# Patient Record
Sex: FEMALE | Race: WHITE | NOT HISPANIC OR LATINO | Employment: UNEMPLOYED | URBAN - METROPOLITAN AREA
[De-identification: names, ages, dates, MRNs, and addresses within clinical notes are randomized per-mention and may not be internally consistent; named-entity substitution may affect disease eponyms.]

---
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
## 2022-03-30 ENCOUNTER — OFFICE VISIT (OUTPATIENT)
Dept: FAMILY MEDICINE CLINIC | Facility: CLINIC | Age: 22
End: 2022-03-30
Payer: MEDICARE

## 2022-03-30 ENCOUNTER — TELEPHONE (OUTPATIENT)
Dept: PSYCHIATRY | Facility: CLINIC | Age: 22
End: 2022-03-30

## 2022-03-30 VITALS
DIASTOLIC BLOOD PRESSURE: 60 MMHG | HEART RATE: 64 BPM | HEIGHT: 64 IN | TEMPERATURE: 97.9 F | BODY MASS INDEX: 17.75 KG/M2 | OXYGEN SATURATION: 99 % | SYSTOLIC BLOOD PRESSURE: 100 MMHG | WEIGHT: 104 LBS

## 2022-03-30 DIAGNOSIS — F41.1 GENERALIZED ANXIETY DISORDER: Primary | ICD-10-CM

## 2022-03-30 DIAGNOSIS — Z11.8 SCREENING FOR CHLAMYDIAL DISEASE: ICD-10-CM

## 2022-03-30 DIAGNOSIS — R63.6 UNDERWEIGHT: ICD-10-CM

## 2022-03-30 PROCEDURE — 99214 OFFICE O/P EST MOD 30 MIN: CPT | Performed by: FAMILY MEDICINE

## 2022-03-30 PROCEDURE — 87591 N.GONORRHOEAE DNA AMP PROB: CPT | Performed by: FAMILY MEDICINE

## 2022-03-30 PROCEDURE — 87491 CHLMYD TRACH DNA AMP PROBE: CPT | Performed by: FAMILY MEDICINE

## 2022-03-30 RX ORDER — SULFACETAMIDE SODIUM AND SULFUR 9; 4.5 MG/G; MG/G
RINSE TOPICAL
COMMUNITY
Start: 2022-03-16

## 2022-03-30 RX ORDER — CLINDAMYCIN PHOSPHATE 10 UG/ML
LOTION TOPICAL
COMMUNITY
Start: 2022-03-16

## 2022-03-30 NOTE — PROGRESS NOTES
BMI Counseling: Body mass index is 18 13 kg/m²  The BMI is below normal  Patient advised to gain weight  Rationale for BMI follow-up plan is due to patient being underweight  Depression Screening and Follow-up Plan: Patient was screened for depression during today's encounter  They screened negative with a PHQ-2 score of 0  Subjective:   Chief Complaint   Patient presents with    Follow-up     chronic conditions        Patient ID: Karthik Sanders is a 23 y o  female  Patient history of generalized anxiety disorder she was started on Zoloft 25 mg once a dayshe has been tolerated medication well without any side effects she notice slight improve in her mood but not quite diarrhea the she been having some stress at home her son was sick she has to go to [facility] at the fairly multiple time and and the affect her sleep her appetite but in general she feel she is calmer compared with before after she start the medication      The following portions of the patient's history were reviewed and updated as appropriate: allergies, current medications, past family history, past medical history, past social history, past surgical history and problem list     Review of Systems   Constitutional: Negative for activity change, appetite change, fatigue and fever  HENT: Negative for congestion, ear pain, sinus pressure, sinus pain and sore throat  Eyes: Negative for pain, discharge, redness and itching  Respiratory: Negative for cough, chest tightness, shortness of breath and stridor  Cardiovascular: Negative for chest pain, palpitations and leg swelling  Gastrointestinal: Negative for abdominal pain, blood in stool, constipation, diarrhea and nausea  Genitourinary: Negative for dysuria, flank pain, frequency and hematuria  Musculoskeletal: Negative for back pain, joint swelling and neck pain  Skin: Negative for pallor and rash     Neurological: Negative for dizziness, tremors, weakness, numbness and headaches  Hematological: Does not bruise/bleed easily  Objective:  Vitals:    03/30/22 1134   BP: 100/60   Pulse: 64   Temp: 97 9 °F (36 6 °C)   TempSrc: Tympanic   SpO2: 99%   Weight: 47 2 kg (104 lb)   Height: 5' 3 5" (1 613 m)      Physical Exam  Vitals and nursing note reviewed  Constitutional:       General: She is not in acute distress  Appearance: She is well-developed  She is not diaphoretic  HENT:      Head: Normocephalic  Right Ear: Tympanic membrane, ear canal and external ear normal       Left Ear: Tympanic membrane, ear canal and external ear normal       Nose: Nose normal  No congestion or rhinorrhea  Mouth/Throat:      Mouth: Mucous membranes are moist       Pharynx: Oropharynx is clear  No oropharyngeal exudate or posterior oropharyngeal erythema  Eyes:      General:         Right eye: No discharge  Left eye: No discharge  Conjunctiva/sclera: Conjunctivae normal       Pupils: Pupils are equal, round, and reactive to light  Neck:      Vascular: No JVD  Cardiovascular:      Rate and Rhythm: Normal rate and regular rhythm  Heart sounds: Normal heart sounds  No murmur heard  No gallop  Pulmonary:      Effort: Pulmonary effort is normal  No respiratory distress  Breath sounds: Normal breath sounds  No stridor  No wheezing or rales  Chest:      Chest wall: No tenderness  Abdominal:      General: There is no distension  Palpations: Abdomen is soft  There is no mass  Tenderness: There is no abdominal tenderness  There is no rebound  Musculoskeletal:         General: No tenderness  Cervical back: Normal range of motion and neck supple  Lymphadenopathy:      Cervical: No cervical adenopathy  Skin:     General: Skin is warm  Findings: No erythema or rash  Neurological:      Mental Status: She is alert and oriented to person, place, and time  Motor: No weakness        Gait: Gait normal  Assessment/Plan:    Generalized anxiety disorder  A chronic patient diagnosed with generalized anxiety Twin disorder she is on Zoloft 25 mg her score MANDY 7 improved from 18-13 a patient tolerated well without side effect recommend to increase it to 50 mg once a day proper use and possible side effect review with the patient    Underweight  The BMI 18 13 no diarrhea and patient having good oral intake previous workup for weight was normal recommend the a increase the calorie intake and discussed important gain weight       Diagnoses and all orders for this visit:    Generalized anxiety disorder  -     sertraline (Zoloft) 50 mg tablet; Take 1 tablet (50 mg total) by mouth daily  -     Ambulatory Referral to [facility]; Future    Screening for chlamydial disease  -     Chlamydia/GC amplified DNA by PCR    Underweight    BMI less than 19,adult    Other orders  -     Sulfacetamide Sodium-Sulfur 9-4 5 % LIQD; Cleanse acne AREAS ONCE OR twice daily  -     clindamycin (CLEOCIN T) 1 % lotion; Apply thin layer TO acne AREAS ON body twice daily

## 2022-03-30 NOTE — TELEPHONE ENCOUNTER
PT LVm, returned pt call and informed pt of the wait list and have have added pt to wait list  Pt has referral

## 2022-04-01 NOTE — ASSESSMENT & PLAN NOTE
A chronic patient diagnosed with generalized anxiety Twin disorder she is on Zoloft 25 mg her score MANDY 7 improved from 18-13 a patient tolerated well without side effect recommend to increase it to 50 mg once a day proper use and possible side effect review with the patient

## 2022-04-02 LAB
C TRACH DNA SPEC QL NAA+PROBE: NEGATIVE
N GONORRHOEA DNA SPEC QL NAA+PROBE: NEGATIVE

## 2022-04-22 ENCOUNTER — OFFICE VISIT (OUTPATIENT)
Dept: FAMILY MEDICINE CLINIC | Facility: CLINIC | Age: 22
End: 2022-04-22
Payer: MEDICARE

## 2022-04-22 VITALS
RESPIRATION RATE: 16 BRPM | OXYGEN SATURATION: 99 % | TEMPERATURE: 97.9 F | DIASTOLIC BLOOD PRESSURE: 62 MMHG | SYSTOLIC BLOOD PRESSURE: 104 MMHG | WEIGHT: 101 LBS | BODY MASS INDEX: 17.24 KG/M2 | HEIGHT: 64 IN | HEART RATE: 80 BPM

## 2022-04-22 DIAGNOSIS — G44.89 OTHER HEADACHE SYNDROME: Primary | ICD-10-CM

## 2022-04-22 DIAGNOSIS — F41.1 GENERALIZED ANXIETY DISORDER: ICD-10-CM

## 2022-04-22 PROCEDURE — 99214 OFFICE O/P EST MOD 30 MIN: CPT | Performed by: FAMILY MEDICINE

## 2022-04-22 RX ORDER — FLUOXETINE HYDROCHLORIDE 20 MG/1
20 CAPSULE ORAL DAILY
Qty: 30 CAPSULE | Refills: 2 | Status: SHIPPED | OUTPATIENT
Start: 2022-04-22 | End: 2022-06-29 | Stop reason: ALTCHOICE

## 2022-04-22 NOTE — ASSESSMENT & PLAN NOTE
A chronic patient feel the Zoloft 50 mg not help in her her symptom actually is not controlled and would like different medication will stop Zoloft start her on Prozac 20 mg proper use and possible side effect discussed the patient patient was refer to psychiatric she is on the waiting next appointment in 6 months

## 2022-04-22 NOTE — ASSESSMENT & PLAN NOTE
A new diagnosis symptomatic it has been going on for long time since her child hold on and off mostly on her forehead radiate to the back and behind her eye a no nausea no sensitivity to the light no vomiting no numbness a no head trauma recommend Tylenol for the pain recommend headache diary the will refer the patient to see a neurology keep why will hydration and sleeping hygiene review

## 2022-04-22 NOTE — PROGRESS NOTES
Subjective:   Chief Complaint   Patient presents with    Headache        Patient ID: Rowan Sanders is a 23 y o  female  Patient's history of generalized anxiety disorder on Zoloft 50 mg she feel the medication is not helping her she been taking it since March and the symptom persistent  Also patient concerned about headache the she described it as tense no forehead behind her eye radiate to the back no nausea no vomiting no light sensitivity no sensitivity no ringing in the ear no numbness no muscle weakness no head trauma no lose control of the urine or stool per patient she had the this since childhood she was not diagnosed with migraine she is not aware of any family history of migraine      The following portions of the patient's history were reviewed and updated as appropriate: allergies, current medications, past family history, past medical history, past social history, past surgical history and problem list     Review of Systems   Constitutional: Negative for activity change, appetite change, fatigue and fever  HENT: Negative for congestion, ear pain, sinus pressure, sinus pain and sore throat  Eyes: Negative for pain, discharge, redness and itching  Respiratory: Negative for cough, chest tightness, shortness of breath and stridor  Cardiovascular: Negative for chest pain, palpitations and leg swelling  Gastrointestinal: Negative for abdominal pain, blood in stool, constipation, diarrhea and nausea  Genitourinary: Negative for dysuria, flank pain, frequency and hematuria  Musculoskeletal: Negative for back pain, joint swelling and neck pain  Skin: Negative for pallor and rash  Neurological: Positive for headaches  Negative for dizziness, tremors, weakness and numbness  Hematological: Does not bruise/bleed easily  Psychiatric/Behavioral: The patient is nervous/anxious                Objective:  Vitals:    04/22/22 1051   BP: 104/62   BP Location: Left arm   Patient Position: Sitting   Cuff Size: Adult   Pulse: 80   Resp: 16   Temp: 97 9 °F (36 6 °C)   TempSrc: Tympanic   SpO2: 99%   Weight: 45 8 kg (101 lb)   Height: 5' 3 5" (1 613 m)      Physical Exam  Constitutional:       General: She is not in acute distress  Appearance: She is not diaphoretic  HENT:      Head: Normocephalic and atraumatic  Nose: Nose normal    Eyes:      General: No scleral icterus  Right eye: No discharge  Left eye: No discharge  Pulmonary:      Effort: Pulmonary effort is normal  No respiratory distress  Abdominal:      General: There is no distension  Musculoskeletal:         General: Normal range of motion  Cervical back: Normal range of motion  Skin:     Findings: No rash  Neurological:      Mental Status: She is alert and oriented to person, place, and time  Motor: No weakness  Gait: Gait normal            Assessment/Plan:    Other headache syndrome  A new diagnosis symptomatic it has been going on for long time since her child hold on and off mostly on her forehead radiate to the back and behind her eye a no nausea no sensitivity to the light no vomiting no numbness a no head trauma recommend Tylenol for the pain recommend headache diary the will refer the patient to see a neurology keep why will hydration and sleeping hygiene review    Generalized anxiety disorder  A chronic patient feel the Zoloft 50 mg not help in her her symptom actually is not controlled and would like different medication will stop Zoloft start her on Prozac 20 mg proper use and possible side effect discussed the patient patient was refer to psychiatric she is on the waiting next appointment in 6 months       Diagnoses and all orders for this visit:    Other headache syndrome  -     Ambulatory Referral to Neurology; Future    Generalized anxiety disorder  -     FLUoxetine (PROzac) 20 mg capsule;  Take 1 capsule (20 mg total) by mouth daily

## 2022-06-23 ENCOUNTER — TELEPHONE (OUTPATIENT)
Dept: OBGYN CLINIC | Facility: CLINIC | Age: 22
End: 2022-06-23

## 2022-06-23 NOTE — TELEPHONE ENCOUNTER
Patient requested to have IUD ordered and then changed her mind  It was purchased using her pharmacy benefits but discarded as discard date has passed

## 2022-06-28 ENCOUNTER — TELEPHONE (OUTPATIENT)
Dept: FAMILY MEDICINE CLINIC | Facility: CLINIC | Age: 22
End: 2022-06-28

## 2022-06-28 NOTE — TELEPHONE ENCOUNTER
Patient said she was on zoloft and then put on fluoxetine  Said it isn't helping and wants to go back on zoloft  Wants to know if you can just change it back or does she need to be seen  If so wants virtual apt   Please advise

## 2022-06-29 DIAGNOSIS — F41.1 GENERALIZED ANXIETY DISORDER: Primary | ICD-10-CM

## 2022-06-29 RX ORDER — SERTRALINE HYDROCHLORIDE 25 MG/1
25 TABLET, FILM COATED ORAL DAILY
Qty: 30 TABLET | Refills: 0 | Status: SHIPPED | OUTPATIENT
Start: 2022-06-29 | End: 2022-07-25

## 2022-06-29 NOTE — TELEPHONE ENCOUNTER
Per dr Oliver How sent script for zoloft 25mg and patient notified  Stopped fluoxetine   Patient also made f/u apt

## 2022-09-07 ENCOUNTER — TELEPHONE (OUTPATIENT)
Dept: PSYCHIATRY | Facility: CLINIC | Age: 22
End: 2022-09-07

## 2022-09-07 NOTE — TELEPHONE ENCOUNTER
contacted patient off [facility] waitlist in attempts to update waitlist  lvm for patient to contact intake dept

## 2022-10-20 NOTE — TELEPHONE ENCOUNTER
Called patient off talk therapy wait list to see if services were still needed  LVM for patient to call intake dept back

## 2023-01-30 ENCOUNTER — TELEPHONE (OUTPATIENT)
Dept: PSYCHIATRY | Facility: CLINIC | Age: 23
End: 2023-01-30

## 2023-01-30 NOTE — TELEPHONE ENCOUNTER
I was unable to leave a VM for pt to return call back to intake in regards to adding pt to the proper waitlist because the number provided is a non working number

## 2023-02-02 ENCOUNTER — OFFICE VISIT (OUTPATIENT)
Dept: URGENT CARE | Facility: MEDICAL CENTER | Age: 23
End: 2023-02-02

## 2023-02-02 VITALS
HEART RATE: 86 BPM | SYSTOLIC BLOOD PRESSURE: 126 MMHG | OXYGEN SATURATION: 99 % | TEMPERATURE: 97.1 F | DIASTOLIC BLOOD PRESSURE: 70 MMHG | RESPIRATION RATE: 18 BRPM

## 2023-02-02 DIAGNOSIS — J02.9 SORE THROAT: ICD-10-CM

## 2023-02-02 DIAGNOSIS — J02.9 ACUTE VIRAL PHARYNGITIS: Primary | ICD-10-CM

## 2023-02-02 LAB — S PYO AG THROAT QL: NEGATIVE

## 2023-02-02 NOTE — PROGRESS NOTES
[facility]        NAME: Delle Felty Delaurentis is a 26 y o  female  : 2000    MRN: [id number]  DATE: 2023  TIME: 2:28 PM    Assessment and Plan   Acute viral pharyngitis [J02 9]  1  Acute viral pharyngitis        2  Sore throat  POCT rapid strepA    Throat culture            Patient Instructions     Rapid strep negative  Sent for culture  No exudate, swelling or petechiae on exam   S&S consistent with viral pharyngitis  OTC meds & supportive care  Follow up with PCP in 2-3 days  Proceed to ER if symptoms worsen  Chief Complaint     Chief Complaint   Patient presents with   • Sore Throat     Patient c/o sore throat x 3-4 days  Patient reports she was exposed to strep  History of Present Illness     26 y o  F  Sore throat x 3 days  Denies CP, SOB, fevers, chills  +strep exposure  Advil OTC    Review of Systems   Review of Systems   Constitutional: Negative for chills, fatigue and fever  HENT: Positive for sore throat  Negative for congestion, ear pain, facial swelling, hearing loss, rhinorrhea, sinus pressure, sneezing and trouble swallowing  Eyes: Negative for pain, redness and visual disturbance  Respiratory: Negative for cough, chest tightness, shortness of breath and wheezing  Cardiovascular: Negative for chest pain and palpitations  Gastrointestinal: Negative for abdominal pain, diarrhea, nausea and vomiting  Genitourinary: Negative for dysuria, flank pain, hematuria and pelvic pain  Musculoskeletal: Negative for arthralgias, back pain and myalgias  Skin: Negative for color change and rash  Neurological: Negative for dizziness, seizures, syncope, weakness, light-headedness and headaches  Psychiatric/Behavioral: Negative for confusion, hallucinations and sleep disturbance  The patient is not nervous/anxious  All other systems reviewed and are negative          Current Medications       Current Outpatient Medications:   •  clindamycin (CLEOCIN T) 1 % lotion, Apply thin layer TO acne AREAS ON body twice daily  , Disp: , Rfl:   •  Multiple Vitamins-Minerals (HM MULTIVITAMIN ADULT GUMMY PO), Take by mouth daily, Disp: , Rfl:   •  sertraline (ZOLOFT) 25 mg tablet, TAKE ONE TABLET BY MOUTH ONCE DAILY, Disp: 30 tablet, Rfl: 2  •  Sulfacetamide Sodium-Sulfur 9-4 5 % LIQD, Cleanse acne AREAS ONCE OR twice daily  , Disp: , Rfl:     Current Allergies     Allergies as of 02/02/2023   • (No Known Allergies)            The following portions of the patient's history were reviewed and updated as appropriate: allergies, current medications, past family history, past medical history, past social history, past surgical history and problem list      Past Medical History:   Diagnosis Date   • Anxiety     never req'd meds   • Depression     never req'd meds       Past Surgical History:   Procedure Laterality Date   • NO PAST SURGERIES         Family History   Problem Relation Age of Onset   • Depression Mother    • Anxiety disorder Mother    • No Known Problems Father    • No Known Problems Maternal Grandmother    • No Known Problems Maternal Grandfather    • No Known Problems Paternal Grandmother    • No Known Problems Paternal Grandfather    • Cancer Neg Hx    • Diabetes Neg Hx    • Breast cancer Neg Hx          Medications have been verified  Objective   /70   Pulse 86   Temp (!) 97 1 °F (36 2 °C)   Resp 18   SpO2 99%   No LMP recorded  Physical Exam     Physical Exam  Vitals reviewed  Constitutional:       General: She is not in acute distress  Appearance: Normal appearance  She is not toxic-appearing  HENT:      Head: Normocephalic  Right Ear: Tympanic membrane normal  Tympanic membrane is not erythematous or bulging  Left Ear: Tympanic membrane normal  Tympanic membrane is not erythematous or bulging  Nose: No congestion or rhinorrhea  Right Sinus: No maxillary sinus tenderness or frontal sinus tenderness        Left Sinus: No maxillary sinus tenderness or frontal sinus tenderness  Mouth/Throat:      Pharynx: Uvula midline  Posterior oropharyngeal erythema present  No oropharyngeal exudate or uvula swelling  Tonsils: No tonsillar exudate or tonsillar abscesses  Eyes:      Extraocular Movements: Extraocular movements intact  Conjunctiva/sclera: Conjunctivae normal       Pupils: Pupils are equal, round, and reactive to light  Cardiovascular:      Rate and Rhythm: Normal rate and regular rhythm  Pulses: Normal pulses  Heart sounds: Normal heart sounds  Pulmonary:      Effort: Pulmonary effort is normal  No tachypnea or respiratory distress  Breath sounds: Normal breath sounds and air entry  No decreased breath sounds, wheezing, rhonchi or rales  Abdominal:      General: Bowel sounds are normal       Palpations: Abdomen is soft  Tenderness: There is no abdominal tenderness  There is no right CVA tenderness or guarding  Musculoskeletal:         General: Normal range of motion  Cervical back: Normal range of motion  Lymphadenopathy:      Cervical: No cervical adenopathy  Skin:     General: Skin is warm and dry  Neurological:      General: No focal deficit present  Mental Status: She is alert  Sensory: Sensation is intact  Motor: Motor function is intact  Coordination: Coordination is intact  Gait: Gait is intact  Deep Tendon Reflexes: Reflexes are normal and symmetric

## 2023-02-02 NOTE — PATIENT INSTRUCTIONS
Rapid strep negative  Will send for culture - follow up in 48-72 hours  With negative strep, likely viral pharyngitis as opposed to strep pharyngitis  OTC medications & supportive care - salt water gargles  Chloraseptic spray  Lozenges  Tylenol or Motrin for pain  Follow up with PCP in 3-5 days  Proceed to ER if symptoms worsen

## 2023-02-05 LAB — BACTERIA THROAT CULT: NORMAL

## 2023-02-06 ENCOUNTER — TELEPHONE (OUTPATIENT)
Dept: PSYCHIATRY | Facility: CLINIC | Age: 23
End: 2023-02-06

## 2023-02-06 NOTE — TELEPHONE ENCOUNTER
I was unable to leave a VM for pt to return call to intake in regards to adding pt to the proper waitlist but the number provided is not a working number

## 2023-02-13 DIAGNOSIS — F41.1 GENERALIZED ANXIETY DISORDER: ICD-10-CM

## 2023-02-13 RX ORDER — SERTRALINE HYDROCHLORIDE 25 MG/1
TABLET, FILM COATED ORAL
Qty: 30 TABLET | Refills: 0 | OUTPATIENT
Start: 2023-02-13

## 2023-02-13 RX ORDER — SERTRALINE HYDROCHLORIDE 25 MG/1
25 TABLET, FILM COATED ORAL DAILY
Qty: 30 TABLET | Refills: 0 | Status: SHIPPED | OUTPATIENT
Start: 2023-02-13

## 2023-02-17 ENCOUNTER — OFFICE VISIT (OUTPATIENT)
Dept: FAMILY MEDICINE CLINIC | Facility: CLINIC | Age: 23
End: 2023-02-17

## 2023-02-17 VITALS
HEART RATE: 85 BPM | DIASTOLIC BLOOD PRESSURE: 64 MMHG | TEMPERATURE: 97.2 F | BODY MASS INDEX: 17.42 KG/M2 | WEIGHT: 102 LBS | OXYGEN SATURATION: 99 % | SYSTOLIC BLOOD PRESSURE: 102 MMHG | HEIGHT: 64 IN

## 2023-02-17 DIAGNOSIS — Z00.01 ENCOUNTER FOR WELL ADULT EXAM WITH ABNORMAL FINDINGS: Primary | ICD-10-CM

## 2023-02-17 DIAGNOSIS — Z12.4 CERVICAL CANCER SCREENING: ICD-10-CM

## 2023-02-17 DIAGNOSIS — R63.6 UNDERWEIGHT: ICD-10-CM

## 2023-02-17 DIAGNOSIS — F41.1 GENERALIZED ANXIETY DISORDER: ICD-10-CM

## 2023-02-17 NOTE — PROGRESS NOTES
[address] [place] [address] PRIMARY CARE [facility]    NAME: Magy Sanders  AGE: 26 y o  SEX: female  : 2000     DATE: 2023     Assessment and Plan:     Problem List Items Addressed This Visit        Other    Encounter for well adult exam with abnormal findings - Primary     Advice and education were given regarding nutrition, aerobic exercises, weight-bearing exercises, cardiovascular risk reduction, fall risk reduction, and age-appropriate supplements  The patient was counseled regarding instructions for management, risk factor reductions, prognosis, risks and benefits of treatment options, patient and family education, and importance of compliance with treatment  Underweight     BMI today 17 41 work-up and previously has been done and negative recommend decrease caloric intake         Generalized anxiety disorder     Patient on Zoloft 25 mg once a day continue current management         BMI less than 19,adult    Cervical cancer screening     Patient already had the OB/GYN provider she will call for appointment            Immunizations and preventive care screenings were discussed with patient today  Appropriate education was printed on patient's after visit summary  Counseling:  Alcohol/drug use: discussed moderation in alcohol intake, the recommendations for healthy alcohol use, and avoidance of illicit drug use  Dental Health: discussed importance of regular tooth brushing, flossing, and dental visits  Injury prevention: discussed safety/seat belts, safety helmets, smoke detectors, carbon dioxide detectors, and smoking near bedding or upholstery  Sexual health: discussed sexually transmitted diseases, partner selection, use of condoms, avoidance of unintended pregnancy, and contraceptive alternatives  Exercise: the importance of regular exercise/physical activity was discussed   Recommend exercise 3-5 times per week for at least 30 minutes  BMI Counseling: Body mass index is 17 41 kg/m²  The BMI is below normal  Patient advised to gain weight  Rationale for BMI follow-up plan is due to patient being underweight  Depression Screening and Follow-up Plan: Patient was screened for depression during today's encounter  They screened negative with a PHQ-2 score of 0  Return in about 1 year (around 2/17/2024)  Chief Complaint:     Chief Complaint   Patient presents with   • Physical Exam      History of Present Illness:     Adult Annual Physical   Patient here for a comprehensive physical exam  The patient reports Patient with history of anxiety she is on Zoloft 25 mg tolerated well without side effect patient presents today for underweight previously low-carb today for checkup  Diet anemia celiac disease patient increase her gabapentin but she is having hard time to being weaned to 2 symptoms in the family her mom grandmother the same no abdomen pain no nausea vomiting or diarrhea no blood in the stool  Diet and Physical Activity  Diet/Nutrition: well balanced diet  Exercise: walking  Depression Screening  PHQ-2/9 Depression Screening    Little interest or pleasure in doing things: 0 - not at all  Feeling down, depressed, or hopeless: 0 - not at all  PHQ-2 Score: 0  PHQ-2 Interpretation: Negative depression screen       General Health  Sleep: sleeps well  Hearing: normal - bilateral   Vision: no vision problems  Dental: regular dental visits  /GYN Health  Last menstrual period: currently  Contraceptive method: none  Review of Systems:     Review of Systems   Constitutional: Negative for chills and fever  HENT: Negative for congestion, ear pain and sore throat  Eyes: Negative for pain and visual disturbance  Respiratory: Negative for cough and shortness of breath  Cardiovascular: Negative for chest pain and palpitations     Gastrointestinal: Negative for abdominal pain, blood in stool, constipation, diarrhea, nausea and vomiting  Genitourinary: Negative for dysuria and hematuria  Musculoskeletal: Negative for arthralgias and back pain  Skin: Negative for color change and rash  Neurological: Negative for seizures and syncope  Hematological: Negative for adenopathy  Does not bruise/bleed easily  Psychiatric/Behavioral: Negative for agitation and behavioral problems  All other systems reviewed and are negative  Past Medical History:     Past Medical History:   Diagnosis Date   • Anxiety     never req'd meds   • Depression     never req'd meds      Past Surgical History:     Past Surgical History:   Procedure Laterality Date   • NO PAST SURGERIES        Social History:     Social History     Socioeconomic History   • Marital status: Single     Spouse name: None   • Number of children: None   • Years of education: None   • Highest education level: None   Occupational History   • None   Tobacco Use   • Smoking status: Never     Passive exposure: Never   • Smokeless tobacco: Never   • Tobacco comments:     used to vape   Vaping Use   • Vaping Use: Former   Substance and Sexual Activity   • Alcohol use: Yes     Alcohol/week: 2 0 standard drinks     Types: 1 Glasses of wine, 1 Shots of liquor per week   • Drug use: Not Currently   • Sexual activity: Yes     Partners: Male     Birth control/protection: None   Other Topics Concern   • None   Social History Narrative   • None     Social Determinants of Health     Financial Resource Strain: Low Risk    • Difficulty of Paying Living Expenses: Not hard at all   Food Insecurity: No Food Insecurity   • Worried About Running Out of Food in the Last Year: Never true   • Ran Out of Food in the Last Year: Never true   Transportation Needs: No Transportation Needs   • Lack of Transportation (Medical): No   • Lack of Transportation (Non-Medical):  No   Physical Activity: Sufficiently Active   • Days of Exercise per Week: 7 days   • Minutes of Exercise per Session: 60 min   Stress: No Stress Concern Present   • Feeling of Stress : Only a little   Social Connections: Moderately Isolated   • Frequency of Communication with Friends and Family: More than three times a week   • Frequency of Social Gatherings with Friends and Family: More than three times a week   • Attends Yazidi Services: Never   • Active Member of Clubs or Organizations: No   • Attends Club or Organization Meetings: Never   • Marital Status: Living with partner   Intimate Partner Violence: Not At Risk   • Fear of Current or Ex-Partner: No   • Emotionally Abused: No   • Physically Abused: No   • Sexually Abused: No   Housing Stability: Unknown   • Unable to Pay for Housing in the Last Year: No   • Number of Places Lived in the Last Year: Not on file   • Unstable Housing in the Last Year: No      Family History:     Family History   Problem Relation Age of Onset   • Depression Mother    • Anxiety disorder Mother    • Alcohol abuse Mother    • Substance Abuse Mother    • Mental illness Mother    • No Known Problems Father    • No Known Problems Maternal Grandmother    • No Known Problems Maternal Grandfather    • No Known Problems Paternal Grandmother    • No Known Problems Paternal Grandfather    • Cancer Neg Hx    • Diabetes Neg Hx    • Breast cancer Neg Hx       Current Medications:     Current Outpatient Medications   Medication Sig Dispense Refill   • clindamycin (CLEOCIN T) 1 % lotion Apply thin layer TO acne AREAS ON body twice daily  • Multiple Vitamins-Minerals (HM MULTIVITAMIN ADULT GUMMY PO) Take by mouth daily     • sertraline (ZOLOFT) 25 mg tablet Take 1 tablet (25 mg total) by mouth daily 30 tablet 0   • Sulfacetamide Sodium-Sulfur 9-4 5 % LIQD Cleanse acne AREAS ONCE OR twice daily  No current facility-administered medications for this visit        Allergies:     No Known Allergies   Physical Exam:     /64 (BP Location: Left arm, Patient Position: Sitting, Cuff Size: Adult)   Pulse 85   Temp (!) 97 2 °F (36 2 °C) (Tympanic)   Ht 5' 4 17" (1 63 m)   Wt 46 3 kg (102 lb)   LMP 02/13/2023 (Exact Date)   SpO2 99%   BMI 17 41 kg/m²     Physical Exam  Vitals and nursing note reviewed  Constitutional:       General: She is not in acute distress  Appearance: She is well-developed  HENT:      Head: Normocephalic and atraumatic  Right Ear: Tympanic membrane normal  There is no impacted cerumen  Left Ear: Tympanic membrane normal  There is no impacted cerumen  Nose: No congestion  Mouth/Throat:      Pharynx: No oropharyngeal exudate  Eyes:      Conjunctiva/sclera: Conjunctivae normal    Cardiovascular:      Rate and Rhythm: Normal rate and regular rhythm  Heart sounds: No murmur heard  Pulmonary:      Effort: Pulmonary effort is normal  No respiratory distress  Breath sounds: Normal breath sounds  Abdominal:      Palpations: Abdomen is soft  Tenderness: There is no abdominal tenderness  Musculoskeletal:         General: No swelling  Cervical back: Neck supple  Skin:     General: Skin is warm and dry  Capillary Refill: Capillary refill takes less than 2 seconds  Findings: No erythema or rash  Neurological:      Mental Status: She is alert and oriented to person, place, and time     Psychiatric:         Mood and Affect: Mood normal           Abdulkadir Chan MD   [address] [place] [address]

## 2023-03-02 ENCOUNTER — TELEPHONE (OUTPATIENT)
Dept: PSYCHIATRY | Facility: CLINIC | Age: 23
End: 2023-03-02

## 2023-03-02 NOTE — TELEPHONE ENCOUNTER
Left message for pt to return call to intake in regards to referral for psychiatry, adding pt to the proper waitlist

## 2023-03-10 DIAGNOSIS — F41.1 GENERALIZED ANXIETY DISORDER: ICD-10-CM

## 2023-03-10 RX ORDER — SERTRALINE HYDROCHLORIDE 25 MG/1
TABLET, FILM COATED ORAL
Qty: 30 TABLET | Refills: 0 | Status: SHIPPED | OUTPATIENT
Start: 2023-03-10

## 2023-04-18 PROBLEM — Z12.4 CERVICAL CANCER SCREENING: Status: RESOLVED | Noted: 2023-02-17 | Resolved: 2023-04-18

## 2023-05-15 DIAGNOSIS — F41.1 GENERALIZED ANXIETY DISORDER: ICD-10-CM

## 2023-05-15 RX ORDER — SERTRALINE HYDROCHLORIDE 25 MG/1
TABLET, FILM COATED ORAL
Qty: 30 TABLET | Refills: 0 | Status: SHIPPED | OUTPATIENT
Start: 2023-05-15 | End: 2023-05-19

## 2023-05-19 DIAGNOSIS — F41.1 GENERALIZED ANXIETY DISORDER: ICD-10-CM

## 2023-05-19 RX ORDER — SERTRALINE HYDROCHLORIDE 25 MG/1
TABLET, FILM COATED ORAL
Qty: 30 TABLET | Refills: 0 | Status: SHIPPED | OUTPATIENT
Start: 2023-05-19

## 2023-06-15 DIAGNOSIS — F41.1 GENERALIZED ANXIETY DISORDER: ICD-10-CM

## 2023-06-15 RX ORDER — SERTRALINE HYDROCHLORIDE 25 MG/1
TABLET, FILM COATED ORAL
Qty: 30 TABLET | Refills: 0 | Status: SHIPPED | OUTPATIENT
Start: 2023-06-15

## 2023-07-13 DIAGNOSIS — F41.1 GENERALIZED ANXIETY DISORDER: ICD-10-CM

## 2023-07-13 RX ORDER — SERTRALINE HYDROCHLORIDE 25 MG/1
TABLET, FILM COATED ORAL
Qty: 30 TABLET | Refills: 0 | Status: SHIPPED | OUTPATIENT
Start: 2023-07-13

## 2023-08-24 DIAGNOSIS — F41.1 GENERALIZED ANXIETY DISORDER: ICD-10-CM

## 2023-08-24 RX ORDER — SERTRALINE HYDROCHLORIDE 25 MG/1
TABLET, FILM COATED ORAL
Qty: 30 TABLET | Refills: 1 | Status: SHIPPED | OUTPATIENT
Start: 2023-08-24

## 2023-12-07 ENCOUNTER — OFFICE VISIT (OUTPATIENT)
Dept: OBGYN CLINIC | Facility: CLINIC | Age: 23
End: 2023-12-07
Payer: MEDICARE

## 2023-12-07 VITALS
BODY MASS INDEX: 18.64 KG/M2 | WEIGHT: 109.2 LBS | SYSTOLIC BLOOD PRESSURE: 110 MMHG | HEIGHT: 64 IN | OXYGEN SATURATION: 99 % | HEART RATE: 76 BPM | DIASTOLIC BLOOD PRESSURE: 62 MMHG

## 2023-12-07 DIAGNOSIS — R10.2 ACUTE PELVIC PAIN: Primary | ICD-10-CM

## 2023-12-07 PROBLEM — Z30.011 ORAL CONTRACEPTION INITIATION: Status: RESOLVED | Noted: 2021-10-18 | Resolved: 2023-12-07

## 2023-12-07 PROBLEM — N93.9 ABNORMAL UTERINE BLEEDING: Status: RESOLVED | Noted: 2021-03-18 | Resolved: 2023-12-07

## 2023-12-07 PROCEDURE — 99213 OFFICE O/P EST LOW 20 MIN: CPT | Performed by: OBSTETRICS & GYNECOLOGY

## 2023-12-07 PROCEDURE — 87491 CHLMYD TRACH DNA AMP PROBE: CPT | Performed by: OBSTETRICS & GYNECOLOGY

## 2023-12-07 PROCEDURE — 87591 N.GONORRHOEAE DNA AMP PROB: CPT | Performed by: OBSTETRICS & GYNECOLOGY

## 2023-12-07 NOTE — PROGRESS NOTES
Subjective   Patient ID: Darrick Sanders is a 26 y.o. female. Patient is here for a problem visit. Chief Complaint   Patient presents with    Follow-up     Painful periods     Last seen 2021. She had initiated sprintec OCP at that time but stopped within a month of that due to bothersome BRB     Usually heavy menses at baseline, some cramping - sometimes takes motrin although not every day. Often has passage of clots. Lasts 7-8 days. Heavy for most of the cycle. Cycles are regular    Today is day 1 of her cycle and she reports episode of severe pelvic cramping pain that was worse than typical. Associated with nausea, did not eat all day. Pain eventually subsided on its own after a few hours. Current bleeding is not heavier than expected     No recent contraception  Not looking to conceive     Medical AB over the summer - denies complications     Stopped zoloft about 2 months ago at her own discretion   No new sexual partners since last testing   No vagintiis or urinary sx     Not looking to be on contraception - concerned about mood issues     Menstrual History:  OB History          1    Para   1    Term   1       0    AB   0    Living   1         SAB   0    IAB   0    Ectopic   0    Multiple   0    Live Births   1                  Patient's last menstrual period was 2023 (exact date). Past Medical History:   Diagnosis Date    Anxiety     never req'd meds    Depression     never req'd meds       Past Surgical History:   Procedure Laterality Date    NO PAST SURGERIES         Social History     Tobacco Use    Smoking status: Never     Passive exposure: Never    Smokeless tobacco: Never    Tobacco comments:     used to vape   Vaping Use    Vaping Use: Former   Substance Use Topics    Alcohol use:  Yes     Alcohol/week: 2.0 standard drinks of alcohol     Types: 1 Glasses of wine, 1 Shots of liquor per week    Drug use: Not Currently        No Known Allergies      Current Outpatient Medications:     clindamycin (CLEOCIN T) 1 % lotion, Apply thin layer TO acne AREAS ON body twice daily. , Disp: , Rfl:     Multiple Vitamins-Minerals (HM MULTIVITAMIN ADULT GUMMY PO), Take by mouth daily, Disp: , Rfl:     sertraline (ZOLOFT) 25 mg tablet, TAKE ONE TABLET BY MOUTH ONCE DAILY, Disp: 30 tablet, Rfl: 1    Sulfacetamide Sodium-Sulfur 9-4.5 % LIQD, Cleanse acne AREAS ONCE OR twice daily. , Disp: , Rfl:       Review of Systems   Constitutional:  Negative for appetite change, chills and fever. Eyes:  Negative for visual disturbance. Respiratory:  Negative for cough, chest tightness and shortness of breath. Cardiovascular:  Negative for chest pain. Gastrointestinal:  Negative for abdominal distention, abdominal pain, constipation, diarrhea, nausea and vomiting. Endocrine: Negative for cold intolerance and heat intolerance. Genitourinary:  Positive for pelvic pain and vaginal bleeding. Negative for difficulty urinating, dyspareunia, dysuria, frequency, genital sores, urgency, vaginal discharge and vaginal pain. Musculoskeletal:  Negative for arthralgias. Neurological:  Negative for light-headedness and headaches. Hematological:  Does not bruise/bleed easily. Psychiatric/Behavioral:  Negative for behavioral problems. All other systems reviewed and are negative. /62   Pulse 76   Ht 5' 4" (1.626 m)   Wt 49.5 kg (109 lb 3.2 oz)   LMP 12/07/2023 (Exact Date)   SpO2 99%   BMI 18.74 kg/m²       Physical Exam  Constitutional:       General: She is not in acute distress. Appearance: Normal appearance. She is not ill-appearing. Genitourinary:      Bladder and urethral meatus normal.      Right Labia: No rash, tenderness, lesions or skin changes. Left Labia: No tenderness, lesions, skin changes or rash. No labial fusion noted. No inguinal adenopathy present in the right or left side. Vaginal bleeding present.       No vaginal discharge, erythema, tenderness or ulceration. Vaginal exam comments: Menstrual blood in vault . Right Adnexa: not tender, not full and no mass present. Left Adnexa: not tender, not full and no mass present. No cervical motion tenderness, discharge, friability, lesion, polyp or eversion. Uterus is not enlarged, fixed, tender or irregular. No uterine mass detected. Uterus is anteverted. Pelvic exam was performed with patient in the lithotomy position. HENT:      Head: Normocephalic. Cardiovascular:      Rate and Rhythm: Normal rate. Heart sounds: Normal heart sounds. Pulmonary:      Effort: Pulmonary effort is normal. No accessory muscle usage or respiratory distress. Abdominal:      General: There is no distension. Palpations: Abdomen is soft. There is no mass. Tenderness: There is no abdominal tenderness. There is no guarding or rebound. Musculoskeletal:         General: Normal range of motion. Cervical back: No rigidity. Lymphadenopathy:      Lower Body: No right inguinal adenopathy. No left inguinal adenopathy. Neurological:      General: No focal deficit present. Mental Status: She is alert. Mental status is at baseline. Skin:     General: Skin is warm and dry. Psychiatric:         Mood and Affect: Mood normal.         Behavior: Behavior normal.   Vitals and nursing note reviewed. Exam conducted with a chaperone present. Appropriate laboratory testing, imaging studies, and prior external records were reviewed:     Assessment/Plan:       Problem List Items Addressed This Visit       Acute pelvic pain - Primary     Current exam is benign, low suspicion for acute process. Offered hormonal contraceptive medication to help with general dysmenorrhea/HMB - briefly reviewed various options. Pt to consider. Pelvic US ordered if pain is persistent. GC/CT collected.  Precautions reviewed         Relevant Orders    US pelvis complete w transvaginal

## 2023-12-07 NOTE — ASSESSMENT & PLAN NOTE
Current exam is benign, low suspicion for acute process. Offered hormonal contraceptive medication to help with general dysmenorrhea/HMB - briefly reviewed various options. Pt to consider. Pelvic US ordered if pain is persistent. GC/CT collected.  Precautions reviewed

## 2023-12-08 LAB
C TRACH DNA SPEC QL NAA+PROBE: NEGATIVE
N GONORRHOEA DNA SPEC QL NAA+PROBE: NEGATIVE

## 2024-02-29 ENCOUNTER — TELEPHONE (OUTPATIENT)
Dept: FAMILY MEDICINE CLINIC | Facility: CLINIC | Age: 24
End: 2024-02-29

## 2024-04-10 ENCOUNTER — TELEPHONE (OUTPATIENT)
Dept: FAMILY MEDICINE CLINIC | Facility: CLINIC | Age: 24
End: 2024-04-10

## 2024-04-24 ENCOUNTER — OFFICE VISIT (OUTPATIENT)
Dept: FAMILY MEDICINE CLINIC | Facility: CLINIC | Age: 24
End: 2024-04-24
Payer: MEDICARE

## 2024-04-24 VITALS
TEMPERATURE: 97.8 F | HEIGHT: 64 IN | OXYGEN SATURATION: 99 % | HEART RATE: 78 BPM | DIASTOLIC BLOOD PRESSURE: 70 MMHG | WEIGHT: 104 LBS | BODY MASS INDEX: 17.75 KG/M2 | SYSTOLIC BLOOD PRESSURE: 100 MMHG

## 2024-04-24 DIAGNOSIS — F41.1 GENERALIZED ANXIETY DISORDER: ICD-10-CM

## 2024-04-24 DIAGNOSIS — R63.6 UNDERWEIGHT: ICD-10-CM

## 2024-04-24 DIAGNOSIS — Z00.01 ENCOUNTER FOR WELL ADULT EXAM WITH ABNORMAL FINDINGS: Primary | ICD-10-CM

## 2024-04-24 DIAGNOSIS — F33.1 MODERATE EPISODE OF RECURRENT MAJOR DEPRESSIVE DISORDER (HCC): ICD-10-CM

## 2024-04-24 PROCEDURE — 99395 PREV VISIT EST AGE 18-39: CPT | Performed by: FAMILY MEDICINE

## 2024-04-24 PROCEDURE — 99214 OFFICE O/P EST MOD 30 MIN: CPT | Performed by: FAMILY MEDICINE

## 2024-04-24 RX ORDER — FLUOXETINE 10 MG/1
10 CAPSULE ORAL DAILY
Qty: 30 CAPSULE | Refills: 1 | Status: SHIPPED | OUTPATIENT
Start: 2024-04-24

## 2024-04-24 RX ORDER — CHOLECALCIFEROL (VITAMIN D3) 50 MCG
2000 TABLET ORAL DAILY
COMMUNITY

## 2024-04-24 RX ORDER — CALCIUM CARBONATE/VITAMIN D3 500-10/5ML
LIQUID (ML) ORAL
COMMUNITY

## 2024-04-24 NOTE — ASSESSMENT & PLAN NOTE
Advice and education were given regarding nutrition, aerobic exercises, weight-bearing exercises, cardiovascular risk reduction, fall risk reduction, and age-appropriate supplements.     The patient was counseled regarding instructions for management, risk factor reductions, prognosis, risks and benefits of treatment options, patient and family education, and importance of compliance with treatment.  Patient follow-up with GYN recommended to call for appointment for Pap smear screening for cervical cancer

## 2024-04-24 NOTE — PROGRESS NOTES
ADULT ANNUAL PHYSICAL  [facility]    NAME: Magy Sanders  AGE: 24 y.o. SEX: female  : 2000     DATE: 2024     Assessment and Plan:     Problem List Items Addressed This Visit       Encounter for well adult exam with abnormal findings - Primary     Advice and education were given regarding nutrition, aerobic exercises, weight-bearing exercises, cardiovascular risk reduction, fall risk reduction, and age-appropriate supplements.     The patient was counseled regarding instructions for management, risk factor reductions, prognosis, risks and benefits of treatment options, patient and family education, and importance of compliance with treatment.  Patient follow-up with GYN recommended to call for appointment for Pap smear screening for cervical cancer         Underweight     Chronic stable patient already had workup previously no abnormal finding recommend important increase calorie intake important gain weight         Generalized anxiety disorder     Symptomatic Zoloft is not helping patient stop it recommend to use alternative will start Prozac 10 mg once a day proper use and possible side effect discussed         Relevant Medications    FLUoxetine (PROzac) 10 mg capsule    Other Relevant Orders    Ambulatory referral to Psych Services    BMI less than 19,adult    Moderate episode of recurrent major depressive disorder (HCC)     New diagnosis symptomatic PHQ-9 =14 patient resting to sleep psychiatric we discussed with the patient we will refer her to psychiatric but there is a waiting time it can be up to 1 year if she interested to start a medication patient used to be on Zoloft before and it did not help her recommend to start Prozac 10 mg once a day proper use and possible side effects reviewed will reevaluate in 4-week         Relevant Medications    FLUoxetine (PROzac) 10 mg capsule    Other Relevant Orders    Ambulatory  referral to Psych Services       Immunizations and preventive care screenings were discussed with patient today. Appropriate education was printed on patient's after visit summary.    Counseling:  Alcohol/drug use: discussed moderation in alcohol intake, the recommendations for healthy alcohol use, and avoidance of illicit drug use.  Dental Health: discussed importance of regular tooth brushing, flossing, and dental visits.  Injury prevention: discussed safety/seat belts, safety helmets, smoke detectors, carbon dioxide detectors, and smoking near bedding or upholstery.  Sexual health: discussed sexually transmitted diseases, partner selection, use of condoms, avoidance of unintended pregnancy, and contraceptive alternatives.  Exercise: the importance of regular exercise/physical activity was discussed. Recommend exercise 3-5 times per week for at least 30 minutes.          Return in about 1 year (around 2025).     Chief Complaint:     Chief Complaint   Patient presents with    Physical Exam      History of Present Illness:     Adult Annual Physical   Patient here for a comprehensive physical exam. The patient reports problems - patient concerned about depression and anxiety she been suffering for a while affecting her quality of the life no suicidal attempt or ideation patient Wyatt Jacobsen before and feels it did not help she would like to see psychiatric past medical surgical family and social history reviewed with the patient .    Diet and Physical Activity  Diet/Nutrition: No special diet   .   Exercise: 5-7 times a week on average.      Depression Screening  PHQ-2/9 Depression Screening    Little interest or pleasure in doing things: 2 - more than half the days  Feeling down, depressed, or hopeless: 2 - more than half the days  Trouble falling or staying asleep, or sleeping too much: 1 - several days  Feeling tired or having little energy: 1 - several days  Poor appetite or overeatin - several  days  Feeling bad about yourself - or that you are a failure or have let yourself or your family down: 1 - several days  Trouble concentrating on things, such as reading the newspaper or watching television: 3 - nearly every day  Moving or speaking so slowly that other people could have noticed. Or the opposite - being so fidgety or restless that you have been moving around a lot more than usual: 3 - nearly every day  Thoughts that you would be better off dead, or of hurting yourself in some way: 0 - not at all  PHQ-2 Score: 4  PHQ-2 Interpretation: POSITIVE depression screen  PHQ-9 Score: 14  PHQ-9 Interpretation: Moderate depression       General Health  Sleep: sleeps poorly.   Hearing: normal - bilateral.  Vision: no vision problems.   Dental: regular dental visits.       /GYN Health  Follows with gynecology? yes   Contraceptive method:  none .    Advanced Care Planning  Do you have an advanced directive? no  Do you have a durable medical power of ? no  ACP document given to the patient? no      Review of Systems:     Review of Systems   Constitutional:  Negative for chills and fever.   HENT:  Negative for ear pain and sore throat.    Eyes:  Negative for pain and visual disturbance.   Respiratory:  Negative for cough and shortness of breath.    Cardiovascular:  Negative for chest pain and palpitations.   Gastrointestinal:  Negative for abdominal pain, blood in stool, constipation, diarrhea and vomiting.   Genitourinary:  Negative for dysuria and hematuria.   Musculoskeletal:  Negative for arthralgias and back pain.   Skin:  Negative for color change and rash.   Neurological:  Negative for seizures and syncope.   Psychiatric/Behavioral:  Positive for decreased concentration and sleep disturbance. Negative for self-injury and suicidal ideas. The patient is nervous/anxious.    All other systems reviewed and are negative.     Past Medical History:     Past Medical History:   Diagnosis Date    Anxiety      never req'd meds    Depression     never req'd meds      Past Surgical History:     Past Surgical History:   Procedure Laterality Date    NO PAST SURGERIES        Social History:     Social History     Socioeconomic History    Marital status: Single     Spouse name: None    Number of children: None    Years of education: None    Highest education level: None   Occupational History    None   Tobacco Use    Smoking status: Never     Passive exposure: Never    Smokeless tobacco: Never    Tobacco comments:     used to vape   Vaping Use    Vaping status: Former   Substance and Sexual Activity    Alcohol use: Yes     Alcohol/week: 2.0 standard drinks of alcohol     Types: 1 Glasses of wine, 1 Shots of liquor per week    Drug use: Not Currently    Sexual activity: Yes     Partners: Male     Birth control/protection: None   Other Topics Concern    None   Social History Narrative    None     Social Determinants of Health     Financial Resource Strain: Low Risk  (2/17/2023)    Overall Financial Resource Strain (CARDIA)     Difficulty of Paying Living Expenses: Not hard at all   Food Insecurity: No Food Insecurity (2/17/2023)    Hunger Vital Sign     Worried About Running Out of Food in the Last Year: Never true     Ran Out of Food in the Last Year: Never true   Transportation Needs: No Transportation Needs (2/17/2023)    PRAPARE - Transportation     Lack of Transportation (Medical): No     Lack of Transportation (Non-Medical): No   Physical Activity: Sufficiently Active (2/17/2023)    Exercise Vital Sign     Days of Exercise per Week: 7 days     Minutes of Exercise per Session: 60 min   Stress: No Stress Concern Present (2/17/2023)    [employer] [employer] - Occupational Stress Questionnaire     Feeling of Stress : Only a little   Social Connections: Moderately Isolated (2/17/2023)    Social Connection and Isolation Panel [NHANES]     Frequency of Communication with Friends and Family: More than three times  a week     Frequency of Social Gatherings with Friends and Family: More than three times a week     Attends Jewish Services: Never     Active Member of Clubs or Organizations: No     Attends Club or Organization Meetings: Never     Marital Status: Living with partner   Intimate Partner Violence: Not At Risk (2/17/2023)    Humiliation, Afraid, Rape, and Kick questionnaire     Fear of Current or Ex-Partner: No     Emotionally Abused: No     Physically Abused: No     Sexually Abused: No   Housing Stability: Unknown (2/17/2023)    Housing Stability Vital Sign     Unable to Pay for Housing in the Last Year: No     Number of Places Lived in the Last Year: Not on file     Unstable Housing in the Last Year: No      Family History:     Family History   Problem Relation Age of Onset    Depression Mother     Anxiety disorder Mother     Alcohol abuse Mother     Substance Abuse Mother     Mental illness Mother     No Known Problems Father     No Known Problems Maternal Grandmother     No Known Problems Maternal Grandfather     No Known Problems Paternal Grandmother     No Known Problems Paternal Grandfather     Cancer Neg Hx     Diabetes Neg Hx     Breast cancer Neg Hx       Current Medications:     Current Outpatient Medications   Medication Sig Dispense Refill    Cholecalciferol (Vitamin D) 50 MCG (2000 UT) tablet Take 2,000 Units by mouth daily      clindamycin (CLEOCIN T) 1 % lotion Apply thin layer TO acne AREAS ON body twice daily.      FLUoxetine (PROzac) 10 mg capsule Take 1 capsule (10 mg total) by mouth daily 30 capsule 1    Multiple Vitamins-Minerals (HM MULTIVITAMIN ADULT GUMMY PO) Take by mouth daily      Sulfacetamide Sodium-Sulfur 9-4.5 % LIQD Cleanse acne AREAS ONCE OR twice daily.      Zinc 30 MG CAPS Take by mouth       No current facility-administered medications for this visit.      Allergies:     No Known Allergies   Physical Exam:     /70 (BP Location: Left arm, Patient Position: Sitting)   Pulse  "78   Temp 97.8 °F (36.6 °C) (Tympanic)   Ht 5' 4\" (1.626 m)   Wt 47.2 kg (104 lb)   SpO2 99%   BMI 17.85 kg/m²     Physical Exam  Vitals and nursing note reviewed.   Constitutional:       General: She is not in acute distress.     Appearance: She is well-developed.   HENT:      Head: Normocephalic and atraumatic.      Right Ear: Tympanic membrane normal. There is no impacted cerumen.      Left Ear: Tympanic membrane normal. There is no impacted cerumen.      Nose: No congestion or rhinorrhea.      Mouth/Throat:      Pharynx: No oropharyngeal exudate or posterior oropharyngeal erythema.   Eyes:      General:         Right eye: No discharge.         Left eye: No discharge.      Conjunctiva/sclera: Conjunctivae normal.   Cardiovascular:      Rate and Rhythm: Normal rate and regular rhythm.      Heart sounds: No murmur heard.  Pulmonary:      Effort: Pulmonary effort is normal. No respiratory distress.      Breath sounds: Normal breath sounds.   Abdominal:      Palpations: Abdomen is soft.      Tenderness: There is no abdominal tenderness.   Musculoskeletal:         General: No swelling.      Cervical back: Neck supple.   Skin:     General: Skin is warm and dry.      Capillary Refill: Capillary refill takes less than 2 seconds.      Findings: No erythema or rash.   Neurological:      Mental Status: She is alert and oriented to person, place, and time.   Psychiatric:         Mood and Affect: Mood normal.          Alexx Abarca MD   [place] [facility]  Depression Screening Follow-up Plan: Patient's depression screening was positive with a PHQ-2 score of 4. Their PHQ-9 score was 14. Patient assessed for underlying major depression. They have no active suicidal ideations. Brief counseling provided and recommend additional follow-up/re-evaluation next office visit.  "

## 2024-04-24 NOTE — ASSESSMENT & PLAN NOTE
New diagnosis symptomatic PHQ-9 =14 patient resting to sleep psychiatric we discussed with the patient we will refer her to psychiatric but there is a waiting time it can be up to 1 year if she interested to start a medication patient used to be on Zoloft before and it did not help her recommend to start Prozac 10 mg once a day proper use and possible side effects reviewed will reevaluate in 4-week

## 2024-04-24 NOTE — ASSESSMENT & PLAN NOTE
Chronic stable patient already had workup previously no abnormal finding recommend important increase calorie intake important gain weight

## 2024-04-24 NOTE — ASSESSMENT & PLAN NOTE
Symptomatic Zoloft is not helping patient stop it recommend to use alternative will start Prozac 10 mg once a day proper use and possible side effect discussed

## 2024-04-29 ENCOUNTER — TELEPHONE (OUTPATIENT)
Age: 24
End: 2024-04-29

## 2024-04-29 NOTE — TELEPHONE ENCOUNTER
PA for FLUoxetine (PROzac) 10 mg capsule     Submitted via    [x]CMM-KEY [id number]  []Surescripts-Case ID #   []Faxed to plan   []Other website   []Phone call Case ID #     Office notes sent, clinical questions answered. Awaiting determination    Turnaround time for your insurance to make a decision on your Prior Authorization can take 7-21 business days.

## 2024-04-30 NOTE — TELEPHONE ENCOUNTER
Rcvd fax  Fluoxetine PA dismissed, on formulary and within plan dosing limits, no pa needed  Scanned in media

## 2024-05-09 ENCOUNTER — TELEPHONE (OUTPATIENT)
Dept: PSYCHIATRY | Facility: CLINIC | Age: 24
End: 2024-05-09

## 2024-05-09 NOTE — TELEPHONE ENCOUNTER
Contacted patient in regards to Routine Referral in attempts to verify patient's needs of services and add patient to proper wait list. Writer left vm to call intake at [phone]    Referral closed due to unable to contact pt

## 2024-08-22 ENCOUNTER — TELEPHONE (OUTPATIENT)
Dept: OBGYN CLINIC | Facility: CLINIC | Age: 24
End: 2024-08-22

## 2024-08-22 NOTE — TELEPHONE ENCOUNTER
Rescheduled patients Dating and Viability appt from 9/12 to 9/13.  She feels this is too long to wait for this appt.  Is this appropriately scheduled?  She states this is her second baby and was seen much sooner last time.  Please call patient if this needs to be moved up.

## 2024-09-10 NOTE — PROGRESS NOTES
Subjective  Patient ID: Magy Sanders is a 24 y.o. female here for amenorrhea    LMP 24 giving her an MARIAMA of 25 and a gestational age of  8 weeks 3days (based on LMP)    Menstrual cycle: 28-30 cycle length:  8-9 days  Pregnancy was planned.   She has  started taking a prenatal vitamin      Signs and symptoms of pregnancy:   Breast tenderness: yes  Fatigue: yes  Cramping or Pelvic Pain: no  Spotting or Vaginal Bleeding: no  Nausea or vomiting: improving,  one episode of vomiting at most per day.    Says when stands up sometimes gets dizzy.  No loss of consciousness.  No chest pain or shortness of breath.   Does have some nause with reflux.  Says is seems to have improved in the last few days.   If vomits no more then once per day.   Urinating ok, without issues.      OB History    Para Term  AB Living   1 1 1 0 0 1   SAB IAB Ectopic Multiple Live Births   0 0 0 0 1      # Outcome Date GA Lbr Devonte/2nd Weight Sex Type Anes PTL Lv   1 Term 21 39w0d / 01:58 3232 g (7 lb 2 oz) M Vag-Spont EPI  CLINT        The following portions of the patient's history were reviewed and updated as appropriate: allergies, current medications, past family history, past medical history, past social history, past surgical history, and problem list.    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    HENT: Negative.    Eyes: Negative.    Respiratory: Negative.    Cardiovascular: Negative.    Gastrointestinal: Negative.    Endocrine: Negative.    Genitourinary:        As noted in HPI   Musculoskeletal: Negative.    Skin: Negative.    Allergic/Immunologic: Negative.    Neurological: Negative.    Hematological: Negative.    Psychiatric/Behavioral: Negative      See HPI for pertinent positives.               /60 (BP Location: Left arm,  "Patient Position: Sitting, Cuff Size: Adult)   Pulse 91   Ht 5' 4\" (1.626 m)   Wt 46.6 kg (102 lb 12.8 oz)   LMP 2024 (Exact Date)   SpO2 99%   BMI 17.65 kg/m²         FIRST TRIMESTER OBSTETRIC ULTRASOUND     LMP 24  INDICATION: Establish Gestational Age       FINDINGS:  A single intrauterine gestation is identified.  Cardiac activity is detected at 178 bpm.      YOLK SAC:  Present and normal in size and appearance.  MEAN CROWN RUMP LENGTH:  17.2 mm = 8 weeks 1 days   AMNIOTIC FLUID/SAC SHAPE:  Within expected normal range.     UTERUS/ADNEXA:   1.10 x 1.27 cm Right ovarian simple cyst  No free fluid identified.    IMPRESSION:    Single intrauterine pregnancy of 8 weeks 1 days gestational age  Fetal cardiac activity detected.  No adnexal masses seen.  EDC by LMP: 25  EDC by this Ultrasound: 25    Assigning a Final MARIAMA  Please choose how you are assigning the MARIAMA: The gestational age by LMP is </= 8w 6d and demonstrates 5 or fewer days difference from the gestational age by CRL, therefore the final MARIAMA will be based on the LMP    Final MARIAMA: 25 by LMP.    Jeff Barrett PA-C  OB/GYN COMPLETE WOMENS [facility]  [address] [place] [address] DR [place] [id number]  Dept: [phone]  Dept Fax: [phone]  Loc Appt: [phone]  Loc: [phone]  Loc Fax: [phone]  Ultrasound Probe Disinfection    A transvaginal ultrasound was performed.   Prior to use, disinfection was performed with High Level Disinfection Process (Trophon).  Probe serial number : [id number] was used.          Assessment/Plan:  SIUP confirmed with MARIAMA 25 by LMP  F/u in 2-3 weeks for ob intake and ob exam  MFM referral placed  Patient to [facility] OB list   OB initial labs with TSH/CMP ordered due to dizziness. Patient will monitor and call if worsens.  I have spent a total time of 30 minutes on 09/10/24 in caring for this patient including Impressions, Counseling / " Coordination of care, Documenting in the medical record, Reviewing / ordering tests, medicine, procedures  , and Obtaining or reviewing history  .

## 2024-09-13 ENCOUNTER — ULTRASOUND (OUTPATIENT)
Dept: OBGYN CLINIC | Facility: CLINIC | Age: 24
End: 2024-09-13
Payer: MEDICARE

## 2024-09-13 VITALS
HEART RATE: 91 BPM | HEIGHT: 64 IN | WEIGHT: 102.8 LBS | OXYGEN SATURATION: 99 % | BODY MASS INDEX: 17.55 KG/M2 | DIASTOLIC BLOOD PRESSURE: 60 MMHG | SYSTOLIC BLOOD PRESSURE: 110 MMHG

## 2024-09-13 DIAGNOSIS — R42 DIZZINESS: ICD-10-CM

## 2024-09-13 DIAGNOSIS — N91.2 AMENORRHEA: Primary | ICD-10-CM

## 2024-09-13 DIAGNOSIS — Z34.90 EARLY STAGE OF PREGNANCY: ICD-10-CM

## 2024-09-13 LAB — SL AMB POCT URINE HCG: POSITIVE

## 2024-09-13 PROCEDURE — 81025 URINE PREGNANCY TEST: CPT | Performed by: PHYSICIAN ASSISTANT

## 2024-09-13 PROCEDURE — 76817 TRANSVAGINAL US OBSTETRIC: CPT | Performed by: PHYSICIAN ASSISTANT

## 2024-09-13 PROCEDURE — 99214 OFFICE O/P EST MOD 30 MIN: CPT | Performed by: PHYSICIAN ASSISTANT

## 2024-09-13 NOTE — PATIENT INSTRUCTIONS
Avoiding fasting for prolonged periods of time - a simple snack such as crackers upon waking may be helpful.  - Meals and snacks should be eaten slowly and in small amounts every one to two hours to avoid an overly full stomach  - Don't lie down immediately after eating  - Avoid triggers foods: examples include excessively spicy, odorous, high-fat, acidic, very sweet foods, coffee, soda  - Hard peppermint candies, tex chews may be helpful     - Vitamin B6 (pyridoxine) 25-50 mg every 6-8 hours  - Unisom (doxylamine) 25 mg 1-2 tabs at night   - pepcid 20mg  twice daily

## 2024-09-17 ENCOUNTER — TELEPHONE (OUTPATIENT)
Age: 24
End: 2024-09-17

## 2024-09-17 NOTE — TELEPHONE ENCOUNTER
Please call patient she was scheduled for ob intake but not ob exam.  Please schedule around 10-12 weeks pregnant.

## 2024-09-23 ENCOUNTER — APPOINTMENT (OUTPATIENT)
Dept: LAB | Facility: MEDICAL CENTER | Age: 24
End: 2024-09-23
Payer: MEDICARE

## 2024-09-23 DIAGNOSIS — Z34.90 EARLY STAGE OF PREGNANCY: ICD-10-CM

## 2024-09-23 DIAGNOSIS — R42 DIZZINESS: ICD-10-CM

## 2024-09-23 LAB
ABO GROUP BLD: NORMAL
ALBUMIN SERPL BCG-MCNC: 4.2 G/DL (ref 3.5–5)
ALP SERPL-CCNC: 44 U/L (ref 34–104)
ALT SERPL W P-5'-P-CCNC: 16 U/L (ref 7–52)
ANION GAP SERPL CALCULATED.3IONS-SCNC: 12 MMOL/L (ref 4–13)
AST SERPL W P-5'-P-CCNC: 19 U/L (ref 13–39)
BACTERIA UR QL AUTO: NORMAL /HPF
BASOPHILS # BLD AUTO: 0.09 THOUSANDS/ΜL (ref 0–0.1)
BASOPHILS NFR BLD AUTO: 1 % (ref 0–1)
BILIRUB SERPL-MCNC: 0.27 MG/DL (ref 0.2–1)
BILIRUB UR QL STRIP: NEGATIVE
BLD GP AB SCN SERPL QL: NEGATIVE
BUN SERPL-MCNC: 12 MG/DL (ref 5–25)
CALCIUM SERPL-MCNC: 9.2 MG/DL (ref 8.4–10.2)
CHLORIDE SERPL-SCNC: 102 MMOL/L (ref 96–108)
CLARITY UR: CLEAR
CO2 SERPL-SCNC: 22 MMOL/L (ref 21–32)
COLOR UR: ABNORMAL
CREAT SERPL-MCNC: 0.54 MG/DL (ref 0.6–1.3)
EOSINOPHIL # BLD AUTO: 0.13 THOUSAND/ΜL (ref 0–0.61)
EOSINOPHIL NFR BLD AUTO: 1 % (ref 0–6)
ERYTHROCYTE [DISTWIDTH] IN BLOOD BY AUTOMATED COUNT: 12.4 % (ref 11.6–15.1)
GFR SERPL CREATININE-BSD FRML MDRD: 134 ML/MIN/1.73SQ M
GLUCOSE SERPL-MCNC: 78 MG/DL (ref 65–140)
GLUCOSE UR STRIP-MCNC: NEGATIVE MG/DL
HBV SURFACE AB SER-ACNC: <3 MIU/ML
HBV SURFACE AG SER QL: NORMAL
HCT VFR BLD AUTO: 38 % (ref 34.8–46.1)
HCV AB SER QL: NORMAL
HGB BLD-MCNC: 12.8 G/DL (ref 11.5–15.4)
HGB UR QL STRIP.AUTO: NEGATIVE
IMM GRANULOCYTES # BLD AUTO: 0.04 THOUSAND/UL (ref 0–0.2)
IMM GRANULOCYTES NFR BLD AUTO: 0 % (ref 0–2)
KETONES UR STRIP-MCNC: NEGATIVE MG/DL
LEUKOCYTE ESTERASE UR QL STRIP: NEGATIVE
LYMPHOCYTES # BLD AUTO: 2.26 THOUSANDS/ΜL (ref 0.6–4.47)
LYMPHOCYTES NFR BLD AUTO: 23 % (ref 14–44)
MCH RBC QN AUTO: 30.2 PG (ref 26.8–34.3)
MCHC RBC AUTO-ENTMCNC: 33.7 G/DL (ref 31.4–37.4)
MCV RBC AUTO: 90 FL (ref 82–98)
MONOCYTES # BLD AUTO: 0.75 THOUSAND/ΜL (ref 0.17–1.22)
MONOCYTES NFR BLD AUTO: 8 % (ref 4–12)
NEUTROPHILS # BLD AUTO: 6.47 THOUSANDS/ΜL (ref 1.85–7.62)
NEUTS SEG NFR BLD AUTO: 67 % (ref 43–75)
NITRITE UR QL STRIP: NEGATIVE
NON-SQ EPI CELLS URNS QL MICRO: NORMAL /HPF
NRBC BLD AUTO-RTO: 0 /100 WBCS
PH UR STRIP.AUTO: 6.5 [PH]
PLATELET # BLD AUTO: 173 THOUSANDS/UL (ref 149–390)
PMV BLD AUTO: 12.2 FL (ref 8.9–12.7)
POTASSIUM SERPL-SCNC: 4 MMOL/L (ref 3.5–5.3)
PROT SERPL-MCNC: 7.4 G/DL (ref 6.4–8.4)
PROT UR STRIP-MCNC: ABNORMAL MG/DL
RBC # BLD AUTO: 4.24 MILLION/UL (ref 3.81–5.12)
RBC #/AREA URNS AUTO: NORMAL /HPF
RH BLD: POSITIVE
RUBV IGG SERPL IA-ACNC: 77.9 IU/ML
SODIUM SERPL-SCNC: 136 MMOL/L (ref 135–147)
SP GR UR STRIP.AUTO: 1.02 (ref 1–1.03)
SPECIMEN EXPIRATION DATE: NORMAL
TREPONEMA PALLIDUM IGG+IGM AB [PRESENCE] IN SERUM OR PLASMA BY IMMUNOASSAY: NORMAL
TSH SERPL DL<=0.05 MIU/L-ACNC: 1.33 UIU/ML (ref 0.45–4.5)
UROBILINOGEN UR STRIP-ACNC: <2 MG/DL
WBC # BLD AUTO: 9.74 THOUSAND/UL (ref 4.31–10.16)
WBC #/AREA URNS AUTO: NORMAL /HPF

## 2024-09-23 PROCEDURE — 36415 COLL VENOUS BLD VENIPUNCTURE: CPT

## 2024-09-23 PROCEDURE — 87389 HIV-1 AG W/HIV-1&-2 AB AG IA: CPT

## 2024-09-23 PROCEDURE — 87340 HEPATITIS B SURFACE AG IA: CPT

## 2024-09-23 PROCEDURE — 86780 TREPONEMA PALLIDUM: CPT

## 2024-09-23 PROCEDURE — 86762 RUBELLA ANTIBODY: CPT

## 2024-09-23 PROCEDURE — 86901 BLOOD TYPING SEROLOGIC RH(D): CPT

## 2024-09-23 PROCEDURE — 86803 HEPATITIS C AB TEST: CPT

## 2024-09-23 PROCEDURE — 86850 RBC ANTIBODY SCREEN: CPT

## 2024-09-23 PROCEDURE — 81001 URINALYSIS AUTO W/SCOPE: CPT

## 2024-09-23 PROCEDURE — 86706 HEP B SURFACE ANTIBODY: CPT

## 2024-09-23 PROCEDURE — 84443 ASSAY THYROID STIM HORMONE: CPT

## 2024-09-23 PROCEDURE — 86900 BLOOD TYPING SEROLOGIC ABO: CPT

## 2024-09-23 PROCEDURE — 85025 COMPLETE CBC W/AUTO DIFF WBC: CPT

## 2024-09-23 PROCEDURE — 80053 COMPREHEN METABOLIC PANEL: CPT

## 2024-09-23 PROCEDURE — 87086 URINE CULTURE/COLONY COUNT: CPT

## 2024-09-24 ENCOUNTER — INITIAL PRENATAL (OUTPATIENT)
Dept: OBGYN CLINIC | Facility: CLINIC | Age: 24
End: 2024-09-24
Payer: MEDICARE

## 2024-09-24 VITALS
BODY MASS INDEX: 18.54 KG/M2 | SYSTOLIC BLOOD PRESSURE: 110 MMHG | DIASTOLIC BLOOD PRESSURE: 68 MMHG | HEIGHT: 63 IN | WEIGHT: 104.6 LBS

## 2024-09-24 DIAGNOSIS — Z3A.10 10 WEEKS GESTATION OF PREGNANCY: ICD-10-CM

## 2024-09-24 DIAGNOSIS — Z34.81 PRENATAL CARE, SUBSEQUENT PREGNANCY, FIRST TRIMESTER: Primary | ICD-10-CM

## 2024-09-24 LAB
BACTERIA UR CULT: ABNORMAL
HIV 1+2 AB+HIV1 P24 AG SERPL QL IA: NORMAL
HIV 2 AB SERPL QL IA: NORMAL
HIV1 AB SERPL QL IA: NORMAL
HIV1 P24 AG SERPL QL IA: NORMAL

## 2024-09-24 PROCEDURE — T1001 NURSING ASSESSMENT/EVALUATN: HCPCS

## 2024-09-24 NOTE — PROGRESS NOTES
OB INTAKE INTERVIEW 2024    Patient is 24 y.o. who presents for OB intake at 10w0d.  She is not accompanied by anyone during this encounter.  The father of her baby (Douglas) is involved in the pregnancy.      Patient's last menstrual period was 2024.  Ultrasound: Measured 8 weeks 1 days on 2024  Estimated Date of Delivery: 25 confirmed by dating ultrasound.    Signs/Symptoms of Pregnancy:  Current pregnancy symptoms: breast tenderness, fatigue, nausea, and frequent urination  Constipation no  Headaches YES  Cramping/spotting YES- cramping- reviewed hydration status.  PICA cravings no    Diabetes:  Body mass index is 18.53 kg/m².  If patient has 1 or more, please order early 1 hour GTT  History of GDM no  BMI >35 no  History of PCOS or current metformin use no  History of LGA/macrosomic infant (4000g/9lbs) no    If patient has 2 or more, please order early 1 hour GTT  BMI>30 no  AMA no  First degree relative with type 2 diabetes no  History of chronic HTN, hyperlipidemia, elevated A1C no  High risk race (, , ,  or ) no    Hypertension: if you answer yes to any of the following, please order baseline preeclampsia labs (cbc, comprehensive metabolic panel, urine protein creatinine ratio, uric acid)  History of of chronic HTN no  History of gestational HTN no  History of preeclampsia, eclampsia, or HELLP syndrome no  History of diabetes no  History of lupus, autoimmune disease, kidney disease no    Thyroid: if yes order TSH with reflex T4  History of thyroid disease no    Bleeding Disorder or Hx of DVT - patient or first degree relative with history of. Order the following if not done previously.   (Factor V, antithrombin III, prothrombin gene mutation, protein C and S Ag, lupus anticoagulant, anticardiolipin, beta-2 glycoprotein)   no    OB/GYN:  History of abnormal pap smear no       Date of last pap smear Not on file- has not  had one  History of HPV no  History of Herpes/HSV YES- hsv1  History of other STI (gonorrhea, chlamydia, trich) no  History of prior  no  History of prior  no  History of  delivery prior to 36 weeks 6 days no  History of blood transfusion no  Ok for blood transfusion YES    Substance screening:   History of tobacco use no  Currently using tobacco no  Currently using alcohol no  Presently using drugs no  Past drug use  no  IV drug use- no  Partner drug use no  Parent/Family drug use YES- mob- mother  Substance Use Screen Level N/A    MRSA Screening:   Does the pt have a hx of MRSA? no    Mental Health:  Hx of/or current dx of depression: YES, Anxiety: YES,  Medications: no not currently  EPDS Screen:  Negative / score 0    Dental Health:  Patient has seen a dentist in the past 6 months YES    Immunizations:  Influenza vaccine given this season YES   Discussed Tdap vaccine YES   Discussed COVID Vaccine YES     Genetic/MFM:  Do you or your partner have a history of any of the following in yourselves or first degree relatives?  Cystic fibrosis no  Spinal muscular atrophy no  Hemoglobinopathy/Sickle Cell/Thalassemia no  Fragile X Intellectual Disability no    If yes, discuss Carrier Screening and recommend consultation with MFM/Genetic Counseling and place specific [facility] Referral for.    Discussed Carrier Screening being completed once in a lifetime as a standard of care lab test. Place orders for Cystic Fibrosis Gene Test (LRR340) and Spinal Muscular Atrophy DNA (BCU5726).  Patient was informed that prior authorization needs to be completed for these tests and this may take 7-10 business days.  Patient does not desire testing for Cystic Fibrosis and Spinal Muscular Atrophy.    ACOG Patient Education Cystic Fibrosis and Spinal Muscular Atrophy prenatal screening given.    Appointment for Nuchal Translucency Ultrasound at [facility] is scheduled for 10/8.    Interview education:  [facility] Pregnancy Essentials  Book reviewed, discussed and attached to their AVS YES     Nurse/Family Partnership-patient may qualify NO; referral placed NO     Prenatal lab work scripts NO    Extra labs ordered:  n/a    Aspirin/Preeclampsia Screen    Risk Level Risk Factor Recommendation   LOW Prior Uncomplicated full-term delivery YES No Aspirin recommendation        MODERATE Nulliparity no Recommend low-dose aspirin if     BMI>30 no 2 or more moderate risk factors    Family History Preeclampsia (mother/sister) no     35yr old or greater no      or Low Socioeconomic no     IVF Pregnancy  no     Personal History Risks (low birth weight, prior adverse preg outcome, >10yr preg interval) no         HIGH History of Preeclampsia no Recommend low-dose aspirin if     Multifetal gestation no 1 or more high risk factors    Chronic HTN no     Type 1 or 2 Diabetes no     Renal Disease no     Autoimmune Disease  no      Contraindications to ASA therapy:  NSAID/ ASA allergy: no  Nasal polyps: no  Asthma with history of ASA induced bronchospasm: no    Relative contraindications:  History of GI bleed: no  Active peptic ulcer disease: no  Severe hepatic dysfunction: no    Patient does not meet recommendation to take ASA 162mg during this pregnancy from 12-36 wks to lower her risk of preeclampsia.        The patient has a history now or in prior pregnancy notable for:    Hx anxiety  Hx depression      Details that I feel the provider should be aware of: Magy and Douglas welcome their second child. Magy son Jacek is seen at [facility] for a chronic lung disease. Magy did have complaint / concern of a shooting pain from lower back to her lower leg. Did speak with dr. Cornejo and possibly sciatic pain. Reviewed recommendations: tylenol, sciatic stretching, and heating pad. Pt verbalized understanding. Pn1 labs completed. No additional blood work needed. Blue folder given and reviewed. Patient did have thoughts of transferring to a office near  [place]. Told patient to keep us updated if she decided to transfer as we do not deliver in that location.     PN1 visit scheduled. The patient was oriented to our practice, the navigator role, reviewed delivering physicians and [facility] for delivery. All questions were answered.    Interviewed by: Maricruz Hutton RN

## 2024-09-24 NOTE — PATIENT INSTRUCTIONS
Congratulations!! Please review our Pregnancy Essential Guide and [facility] L&D Virtual tour from our networks website.     [facility] Pregnancy Essentials Guide  [facility] ([email or web address])          Maricruz JASSO RN  OB Nurse Navigator

## 2024-09-26 ENCOUNTER — TELEPHONE (OUTPATIENT)
Dept: OBGYN CLINIC | Facility: CLINIC | Age: 24
End: 2024-09-26

## 2024-09-26 NOTE — TELEPHONE ENCOUNTER
Spoke with patient reviewed that her urine is showing bacteria but low numbers and this bacteria is usually a vaginal contaminate.     Patient denies urinary complaints.      Urine Culture 40,000-49,000 cfu/ml Lactobacillus species        Reviewed trace protein in urine.  Recommend good hydration. Has ob appt scheduled for next week

## 2024-10-03 ENCOUNTER — INITIAL PRENATAL (OUTPATIENT)
Dept: OBGYN CLINIC | Facility: CLINIC | Age: 24
End: 2024-10-03
Payer: MEDICARE

## 2024-10-03 VITALS
WEIGHT: 109.2 LBS | BODY MASS INDEX: 19.35 KG/M2 | OXYGEN SATURATION: 96 % | HEIGHT: 63 IN | HEART RATE: 72 BPM | SYSTOLIC BLOOD PRESSURE: 112 MMHG | DIASTOLIC BLOOD PRESSURE: 62 MMHG

## 2024-10-03 DIAGNOSIS — Z3A.11 11 WEEKS GESTATION OF PREGNANCY: Primary | ICD-10-CM

## 2024-10-03 DIAGNOSIS — Z12.4 SCREENING FOR CERVICAL CANCER: ICD-10-CM

## 2024-10-03 DIAGNOSIS — Z11.3 SCREEN FOR STD (SEXUALLY TRANSMITTED DISEASE): ICD-10-CM

## 2024-10-03 DIAGNOSIS — Z34.91 ENCOUNTER FOR SUPERVISION OF LOW-RISK PREGNANCY IN FIRST TRIMESTER: ICD-10-CM

## 2024-10-03 PROCEDURE — 87491 CHLMYD TRACH DNA AMP PROBE: CPT | Performed by: OBSTETRICS & GYNECOLOGY

## 2024-10-03 PROCEDURE — G0145 SCR C/V CYTO,THINLAYER,RESCR: HCPCS | Performed by: STUDENT IN AN ORGANIZED HEALTH CARE EDUCATION/TRAINING PROGRAM

## 2024-10-03 PROCEDURE — 87591 N.GONORRHOEAE DNA AMP PROB: CPT | Performed by: OBSTETRICS & GYNECOLOGY

## 2024-10-03 PROCEDURE — 76815 OB US LIMITED FETUS(S): CPT | Performed by: OBSTETRICS & GYNECOLOGY

## 2024-10-03 PROCEDURE — 99213 OFFICE O/P EST LOW 20 MIN: CPT | Performed by: OBSTETRICS & GYNECOLOGY

## 2024-10-03 RX ORDER — UREA 10 %
500 LOTION (ML) TOPICAL DAILY
COMMUNITY

## 2024-10-03 NOTE — PROGRESS NOTES
Subjective   Patient ID: Magy Sanders is a 24 y.o. female.    Patient is here for a problem visit.    Chief Complaint   Patient presents with    Initial Prenatal Visit     11+2 WGA. Had intake on   NT scheduled 10/8     No prior pap  Needs GC/CT    No cramping or VB  Nausea improving, not vomiting frequently anymore     Menstrual History:  OB History          2    Para   1    Term   1       0    AB   0    Living   1         SAB   0    IAB   0    Ectopic   0    Multiple   0    Live Births   1           Obstetric Comments   Menarche, 16                Patient's last menstrual period was 2024.         Past Medical History:   Diagnosis Date    Anxiety     never req'd meds    Depression     never req'd meds    Varicella     vaccinated       Past Surgical History:   Procedure Laterality Date    NO PAST SURGERIES      WISDOM TOOTH EXTRACTION         Social History     Tobacco Use    Smoking status: Never     Passive exposure: Never    Smokeless tobacco: Never    Tobacco comments:     used to vape   Vaping Use    Vaping status: Former   Substance Use Topics    Alcohol use: Not Currently     Alcohol/week: 2.0 standard drinks of alcohol     Types: 1 Glasses of wine, 1 Shots of liquor per week     Comment: social- prior to pregnancy    Drug use: Never        No Known Allergies      Current Outpatient Medications:     Prenatal MV-Min-Fe Fum-FA-DHA (PRENATAL 1 PO), Take by mouth, Disp: , Rfl:     vitamin B-12 (VITAMIN B-12) 500 mcg tablet, Take 500 mcg by mouth daily, Disp: , Rfl:     Cholecalciferol (Vitamin D) 50 MCG (2000 UT) tablet, Take 2,000 Units by mouth daily (Patient not taking: Reported on 2024), Disp: , Rfl:     clindamycin (CLEOCIN T) 1 % lotion, Apply thin layer TO acne AREAS ON body twice daily. (Patient not taking: Reported on 2024), Disp: , Rfl:     FLUoxetine (PROzac) 10 mg capsule, Take 1 capsule (10 mg total) by mouth daily (Patient not taking: Reported on  "9/13/2024), Disp: 30 capsule, Rfl: 1    Multiple Vitamins-Minerals (HM MULTIVITAMIN ADULT GUMMY PO), Take by mouth daily (Patient not taking: Reported on 9/13/2024), Disp: , Rfl:     Sulfacetamide Sodium-Sulfur 9-4.5 % LIQD, Cleanse acne AREAS ONCE OR twice daily. (Patient not taking: Reported on 9/13/2024), Disp: , Rfl:     Zinc 30 MG CAPS, Take by mouth (Patient not taking: Reported on 9/13/2024), Disp: , Rfl:       Review of Systems   Constitutional:  Negative for chills and fever.   Eyes:  Negative for visual disturbance.   Respiratory:  Negative for chest tightness and shortness of breath.    Cardiovascular:  Negative for chest pain.   Gastrointestinal:  Negative for abdominal pain, diarrhea, nausea and vomiting.   Genitourinary:  Negative for pelvic pain and vaginal bleeding.        As noted in HPI   Skin:  Negative for rash.   Neurological:  Negative for headaches.   All other systems reviewed and are negative.        /62 (BP Location: Right arm, Patient Position: Sitting, Cuff Size: Standard)   Pulse 72   Ht 5' 3\" (1.6 m)   Wt 49.5 kg (109 lb 3.2 oz)   LMP 07/16/2024   SpO2 96%   BMI 19.34 kg/m²       Physical Exam  Constitutional:       General: She is not in acute distress.     Appearance: Normal appearance. She is not ill-appearing.   Genitourinary:      Bladder and urethral meatus normal.      Right Labia: No rash, tenderness, lesions or skin changes.     Left Labia: No tenderness, lesions, skin changes or rash.     No labial fusion noted.      No inguinal adenopathy present in the right or left side.     No vaginal discharge, erythema, tenderness, bleeding or ulceration.        Right Adnexa: not tender, not full and no mass present.     Left Adnexa: not tender, not full and no mass present.     No cervical motion tenderness, discharge, friability, lesion, polyp or eversion.      Uterus is enlarged.      Uterus is not fixed, tender or irregular.      No uterine mass detected.     Uterus is " anteverted.      Pelvic exam was performed with patient in the lithotomy position.   HENT:      Head: Normocephalic.   Cardiovascular:      Rate and Rhythm: Normal rate.      Heart sounds: Normal heart sounds.   Pulmonary:      Effort: Pulmonary effort is normal. No accessory muscle usage or respiratory distress.   Abdominal:      General: There is no distension.      Palpations: Abdomen is soft. There is no mass.      Tenderness: There is no abdominal tenderness. There is no guarding or rebound.   Musculoskeletal:         General: Normal range of motion.      Cervical back: No rigidity.   Lymphadenopathy:      Lower Body: No right inguinal adenopathy. No left inguinal adenopathy.   Neurological:      General: No focal deficit present.      Mental Status: She is alert. Mental status is at baseline.   Skin:     General: Skin is warm and dry.   Psychiatric:         Mood and Affect: Mood normal.         Behavior: Behavior normal.   Vitals and nursing note reviewed. Exam conducted with a chaperone present.         Limited transabdominal US with SLIUP,  bpm       Appropriate laboratory testing, imaging studies, and prior external records were reviewed:     Assessment/Plan:       Problem List Items Addressed This Visit       11 weeks gestation of pregnancy - Primary    Encounter for supervision of low-risk pregnancy in first trimester     Reviewed benefits of influenza vaccination in pregnancy including but not limited to reduction in maternal influenza hospitalization, reduction in risk of stillbirth, and reduction in influenza-related morbidity and mortality among infants. Reviewed safety of influenza vaccine in pregnancy and overall very low risk of reaction or adverse effects. Patient voiced understanding of all this and DECLINES vaccination today.   Has MFM appt upcoming  Reviewed normal prenatal labs, except hep B nonimmune, can f/u with PCP for vaccination  Pap, GC/CT collected  OB precautions reviewed

## 2024-10-03 NOTE — ASSESSMENT & PLAN NOTE
Reviewed benefits of influenza vaccination in pregnancy including but not limited to reduction in maternal influenza hospitalization, reduction in risk of stillbirth, and reduction in influenza-related morbidity and mortality among infants. Reviewed safety of influenza vaccine in pregnancy and overall very low risk of reaction or adverse effects. Patient voiced understanding of all this and DECLINES vaccination today.   Has MFM appt upcoming  Reviewed normal prenatal labs, except hep B nonimmune, can f/u with PCP for vaccination  Pap, GC/CT collected  OB precautions reviewed

## 2024-10-07 PROBLEM — F32.A DEPRESSION AFFECTING PREGNANCY IN FIRST TRIMESTER, ANTEPARTUM: Status: ACTIVE | Noted: 2024-10-07

## 2024-10-07 PROBLEM — O99.341 DEPRESSION AFFECTING PREGNANCY IN FIRST TRIMESTER, ANTEPARTUM: Status: ACTIVE | Noted: 2024-10-07

## 2024-10-07 LAB
C TRACH DNA SPEC QL NAA+PROBE: NEGATIVE
N GONORRHOEA DNA SPEC QL NAA+PROBE: NEGATIVE

## 2024-10-08 ENCOUNTER — ROUTINE PRENATAL (OUTPATIENT)
Dept: PERINATAL CARE | Facility: OTHER | Age: 24
End: 2024-10-08
Payer: MEDICARE

## 2024-10-08 VITALS
SYSTOLIC BLOOD PRESSURE: 104 MMHG | HEART RATE: 64 BPM | DIASTOLIC BLOOD PRESSURE: 68 MMHG | WEIGHT: 110.2 LBS | HEIGHT: 63 IN | BODY MASS INDEX: 19.53 KG/M2

## 2024-10-08 DIAGNOSIS — Z3A.12 12 WEEKS GESTATION OF PREGNANCY: ICD-10-CM

## 2024-10-08 DIAGNOSIS — Z34.90 EARLY STAGE OF PREGNANCY: ICD-10-CM

## 2024-10-08 DIAGNOSIS — Z36.82 ENCOUNTER FOR (NT) NUCHAL TRANSLUCENCY SCAN: ICD-10-CM

## 2024-10-08 DIAGNOSIS — Z36.0 ENCOUNTER FOR ANTENATAL SCREENING FOR CHROMOSOMAL ANOMALIES: ICD-10-CM

## 2024-10-08 DIAGNOSIS — F32.A DEPRESSION AFFECTING PREGNANCY IN FIRST TRIMESTER, ANTEPARTUM: Primary | ICD-10-CM

## 2024-10-08 DIAGNOSIS — O99.341 DEPRESSION AFFECTING PREGNANCY IN FIRST TRIMESTER, ANTEPARTUM: Primary | ICD-10-CM

## 2024-10-08 PROBLEM — R63.4 WEIGHT LOSS: Status: RESOLVED | Noted: 2021-11-08 | Resolved: 2024-10-08

## 2024-10-08 PROBLEM — Z3A.11 11 WEEKS GESTATION OF PREGNANCY: Status: RESOLVED | Noted: 2024-10-03 | Resolved: 2024-10-08

## 2024-10-08 PROBLEM — R10.2 ACUTE PELVIC PAIN: Status: RESOLVED | Noted: 2023-12-07 | Resolved: 2024-10-08

## 2024-10-08 PROBLEM — Z00.01 ENCOUNTER FOR WELL ADULT EXAM WITH ABNORMAL FINDINGS: Status: RESOLVED | Noted: 2021-11-08 | Resolved: 2024-10-08

## 2024-10-08 PROBLEM — K64.9 HEMORRHOIDS: Status: RESOLVED | Noted: 2021-10-18 | Resolved: 2024-10-08

## 2024-10-08 PROCEDURE — 36415 COLL VENOUS BLD VENIPUNCTURE: CPT | Performed by: OBSTETRICS & GYNECOLOGY

## 2024-10-08 PROCEDURE — 76813 OB US NUCHAL MEAS 1 GEST: CPT | Performed by: OBSTETRICS & GYNECOLOGY

## 2024-10-08 PROCEDURE — 99243 OFF/OP CNSLTJ NEW/EST LOW 30: CPT | Performed by: NURSE PRACTITIONER

## 2024-10-08 NOTE — LETTER
2024     Jeff Barrett PA-C  [address]  [address]  [place] [id number]    Patient: Magy Sanders   YOB: 2000   Date of Visit: 10/8/2024       Dear Dr. Barrett:    Thank you for referring Magy Sanders to me for evaluation. Below are my notes for this consultation.    If you have questions, please do not hesitate to call me. I look forward to following your patient along with you.         Sincerely,        Cordelia Porter MD        CC: No Recipients    Cordelia Porter MD  10/8/2024  8:41 PM  Sign when Signing Visit  OFFICE CONSULT      Dear Ms. Barrett,    Thank you for requesting a  consultation on your patient Magy Sanders for the following indications:  Genetic screening    History  Medications: Vitamins B12 and prenatal vitamin  Allergies to medications: No known drug allergies  Past medical history: Depression and anxiety  Past surgical history: Tully tooth extraction  Past obstetrical history: .  In 2021 she had a term vaginal delivery of a female  weighing 7 pounds 2 ounces.  She denies pregnancy complications.  Social history: Denies current use of alcohol, tobacco or drugs of abuse.  First generation family history: Alcohol and substance abuse in her mother.    Ultrasound findings: The ultrasound shows a fetus concordant with dates. The nasal bone and nuchal translucency appear normal. No malformations are seen on today's early ultrasound.       She does not report any vaginal bleeding or uterine cramping or contractions.      Specific counseling was provided on the following problems:  1. We discussed the options for genetic screening which include invasive testing on the fetal placenta or on fetal skin cells within the amniotic fluid and compared this to noninvasive testing which includes cell free DNA screening and the sequential screen.  We reviewed the risks, the benefits and the limitations of each.  In the end  patient chose to complete the cell free DNA screen.    2.  We discussed her history of depression. She was using Prozac for support. We discussed that there is abundant safety data on SSRI use in pregnancy. There are significant risks associated with untreated and under treated depression and other mental illnesses in pregnancy. The [facility] is an additional resource for screening and treatment of depression. Regarding antidepressants in pregnancy, guidelines encourage women who took effective antidepressant regimens prior to pregnancy, to continue with the same treatment regimen. The use of selective serotonin reuptake inhibitors (SSRIs) in pregnancy is also associated with a risk of transient  withdrawal, which is typically mild, self-limited, and does not usually require  intensive care. SSRIs are compatible with breastfeeding (ACOG Practice Bulletin #92, Use of Psychiatric Medications in Pregnancy and Lactation). If desired, neonatology consultation is available in the third trimester to further discuss these  complications. I recommend early postpartum follow-up of her mood, and referral for therapy if needed.      3.  We reviewed current recommendations regarding  Flu, COVID and RSV (third trimester) vaccines by the American College of Obstetricians and Gynecologists and the Society for Maternal-Fetal Medicine. We discussed reassuring pregnancy outcome information after vaccination. We discussed the increased severity of infections and the resultant maternal and fetal complications that can arise with a severe infection including  labor.  Vaccines have been found to generate  antibodies in pregnant and lactating women similar to that observed in non-pregnant women. Vaccine-induced antibody levels were significantly greater than the levels found in response to natural infection. Immune transfer of these antibodies to neonates is found to occur via the placenta  and breast milk.    Future tests recommended:  The results of her NIPT will return in 7-10 days and her OB office will order an MSAFP screen at 16-18 weeks to screen for spina bifida.       Future ultrasounds ordered today:   Fetal Level II ultrasound imaging is recommended at 19-20 weeks' gestation.    Split-shared decision-making between ANISH Bloom and myself was utilized, with the majority of the time spent by ENRIQUE Bloom.  Medical decision-making for this encounter was moderate (diagnosis moderate, data moderate and risk moderate).    I reviewed the ultrasound pictures and recommended the medical decision making transcribed in the care of this patient.      Cordelia Porter M.D.

## 2024-10-08 NOTE — PROGRESS NOTES
Patient chose to have [facility] [id number] Non-Invasive Prenatal Screen [id number] PLUS w/ SCA, WITH fetal sex.  Patient choose to be billed through insurance.     Patient given brochure and is aware [facility] will contact patient's insurance and coordinate coverage.  Provided [facility] contact information. General inquiries [phone], Cost estimates [phone] and [facility] Billing [phone]. Website [email or web address].     Blood collection tubes labeled with patient identifiers (name, medical record number, and date of birth).     Filled out [facility] order form. Patient chose to have blood drawn in our office at time of visit. NIPS was drawn from left arm with a straight needle by Ladi ODONNELL .    Maternal Fetal Medicine will have results in approximately 5-7 business days and will call patient or notify via [employer] [facility].  Patient aware viewing lab result online will reveal fetal sex if ordered.    Patient verbalized understanding of all instructions and no questions at this time.

## 2024-10-09 LAB
LAB AP GYN PRIMARY INTERPRETATION: NORMAL
LAB AP LMP: NORMAL
Lab: NORMAL

## 2024-10-09 NOTE — PROGRESS NOTES
OFFICE CONSULT      Dear Ms. Barrett,    Thank you for requesting a  consultation on your patient Magy Sanders for the following indications:  Genetic screening    History  Medications: Vitamins B12 and prenatal vitamin  Allergies to medications: No known drug allergies  Past medical history: Depression and anxiety  Past surgical history: [place] tooth extraction  Past obstetrical history: .  In 2021 she had a term vaginal delivery of a female  weighing 7 pounds 2 ounces.  She denies pregnancy complications.  Social history: Denies current use of alcohol, tobacco or drugs of abuse.  First generation family history: Alcohol and substance abuse in her mother.    Ultrasound findings: The ultrasound shows a fetus concordant with dates. The nasal bone and nuchal translucency appear normal. No malformations are seen on today's early ultrasound.       She does not report any vaginal bleeding or uterine cramping or contractions.      Specific counseling was provided on the following problems:  1. We discussed the options for genetic screening which include invasive testing on the fetal placenta or on fetal skin cells within the amniotic fluid and compared this to noninvasive testing which includes cell free DNA screening and the sequential screen.  We reviewed the risks, the benefits and the limitations of each.  In the end patient chose to complete the cell free DNA screen.    2.  We discussed her history of depression. She was using Prozac for support. We discussed that there is abundant safety data on SSRI use in pregnancy. There are significant risks associated with untreated and under treated depression and other mental illnesses in pregnancy. The [facility] is an additional resource for screening and treatment of depression. Regarding antidepressants in pregnancy, guidelines encourage women who took effective antidepressant regimens prior to pregnancy, to continue  with the same treatment regimen. The use of selective serotonin reuptake inhibitors (SSRIs) in pregnancy is also associated with a risk of transient  withdrawal, which is typically mild, self-limited, and does not usually require  intensive care. SSRIs are compatible with breastfeeding (ACOG Practice Bulletin #92, Use of Psychiatric Medications in Pregnancy and Lactation). If desired, neonatology consultation is available in the third trimester to further discuss these  complications. I recommend early postpartum follow-up of her mood, and referral for therapy if needed.      3.  We reviewed current recommendations regarding  Flu, COVID and RSV (third trimester) vaccines by the American College of Obstetricians and Gynecologists and the Society for Maternal-Fetal Medicine. We discussed reassuring pregnancy outcome information after vaccination. We discussed the increased severity of infections and the resultant maternal and fetal complications that can arise with a severe infection including  labor.  Vaccines have been found to generate  antibodies in pregnant and lactating women similar to that observed in non-pregnant women. Vaccine-induced antibody levels were significantly greater than the levels found in response to natural infection. Immune transfer of these antibodies to neonates is found to occur via the placenta and breast milk.    Future tests recommended:  The results of her NIPT will return in 7-10 days and her OB office will order an MSAFP screen at 16-18 weeks to screen for spina bifida.       Future ultrasounds ordered today:   Fetal Level II ultrasound imaging is recommended at 19-20 weeks' gestation.    Split-shared decision-making between ANISH Bloom and myself was utilized, with the majority of the time spent by ENRIQUE Bloom.  Medical decision-making for this encounter was moderate (diagnosis moderate, data moderate and risk moderate).    I reviewed the  ultrasound pictures and recommended the medical decision making transcribed in the care of this patient.      Cordelia Porter M.D.

## 2024-10-16 LAB
CFDNA.FET/CFDNA.TOTAL SFR FETUS: NORMAL %
CITATION REF LAB TEST: NORMAL
FET 13+18+21+X+Y ANEUP PLAS.CFDNA: NEGATIVE
FET CHR 21 TS PLAS.CFDNA QL: NEGATIVE
FET CHR 21 TS PLAS.CFDNA QL: NEGATIVE
FET MS X RISK WBC.DNA+CFDNA QL: NOT DETECTED
FET SEX PLAS.CFDNA DOSAGE CFDNA: NORMAL
FET TS 13 RISK PLAS.CFDNA QL: NEGATIVE
FET X + Y ANEUP RISK PLAS.CFDNA SEQ-IMP: NOT DETECTED
GA EST FROM CONCEPTION DATE: NORMAL D
GESTATIONAL AGE > 9:: YES
LAB DIRECTOR NAME PROVIDER: NORMAL
LAB DIRECTOR NAME PROVIDER: NORMAL
LABORATORY COMMENT REPORT: NORMAL
LIMITATIONS OF THE TEST: NORMAL
NEGATIVE PREDICTIVE VALUE: NORMAL
PERFORMANCE CHARACTERISTICS: NORMAL
POSITIVE PREDICTIVE VALUE: NORMAL
REF LAB TEST METHOD: NORMAL
SL AMB NOTE:: NORMAL
TEST PERFORMANCE INFO SPEC: NORMAL

## 2024-10-17 ENCOUNTER — TELEPHONE (OUTPATIENT)
Age: 24
End: 2024-10-17

## 2024-10-17 ENCOUNTER — TELEPHONE (OUTPATIENT)
Dept: OBGYN CLINIC | Facility: CLINIC | Age: 24
End: 2024-10-17

## 2024-10-17 DIAGNOSIS — Z36.1 NEED FOR MATERNAL SERUM ALPHA-PROTEIN (MSAFP) SCREENING: Primary | ICD-10-CM

## 2024-10-17 DIAGNOSIS — Z3A.16 16 WEEKS GESTATION OF PREGNANCY: ICD-10-CM

## 2024-10-17 NOTE — TELEPHONE ENCOUNTER
Patient viewed [id number] NIPT result in her portal. She is aware of Negative results for Chromosomes 21,18,13 and knows fetal sex however she had additional questions about the lab verbiage regarding NPV negative predictive values. Patient would be available after 11:30 and would like call back.  [employer] message sent to [facility].

## 2024-10-17 NOTE — TELEPHONE ENCOUNTER
----- Message from ANISH Kang sent at 10/17/2024  9:50 AM EDT -----  Dr. Cornejo,  I sent your patient a [employer] results message regarding her low risk genetic screening. MSAFP should be ordered by your office to be completed between 16-20 weeks gestation. Please let me know if you have any questions.   Thank you.    Charmaine

## 2024-10-21 ENCOUNTER — TELEPHONE (OUTPATIENT)
Dept: PERINATAL CARE | Facility: CLINIC | Age: 24
End: 2024-10-21

## 2024-10-21 NOTE — TELEPHONE ENCOUNTER
"Called patient and confirmed date of birth.  Patient asked to clarify the  comment on her [id number] results \"This specimen showed an expected representation of chromosome 21, 18 and 13 material. Clinical correlation is   suggested.\"  Explained that the lab identified the correct amount of DNA we expect to see for just two of each of the chromosomes mentioned - nothing extra or nothing missing.  This is what we hope to see with a normal or low risk result.  Patient expressed verbal understanding and had no further questions.  Encouraged her to call with any concerns.    "

## 2024-10-30 PROBLEM — Z3A.15 15 WEEKS GESTATION OF PREGNANCY: Status: ACTIVE | Noted: 2024-10-30

## 2024-10-30 PROBLEM — Z34.92 ENCOUNTER FOR SUPERVISION OF LOW-RISK PREGNANCY IN SECOND TRIMESTER: Status: ACTIVE | Noted: 2024-10-03

## 2024-10-30 PROBLEM — O99.342 DEPRESSION COMPLICATING PREGNANCY IN SECOND TRIMESTER, ANTEPARTUM: Status: ACTIVE | Noted: 2024-10-07

## 2024-11-04 ENCOUNTER — ROUTINE PRENATAL (OUTPATIENT)
Dept: OBGYN CLINIC | Facility: CLINIC | Age: 24
End: 2024-11-04
Payer: MEDICARE

## 2024-11-04 VITALS
SYSTOLIC BLOOD PRESSURE: 120 MMHG | HEIGHT: 63 IN | WEIGHT: 117.3 LBS | BODY MASS INDEX: 20.79 KG/M2 | DIASTOLIC BLOOD PRESSURE: 72 MMHG

## 2024-11-04 DIAGNOSIS — F32.A DEPRESSION COMPLICATING PREGNANCY IN SECOND TRIMESTER, ANTEPARTUM: ICD-10-CM

## 2024-11-04 DIAGNOSIS — O99.342 DEPRESSION COMPLICATING PREGNANCY IN SECOND TRIMESTER, ANTEPARTUM: ICD-10-CM

## 2024-11-04 DIAGNOSIS — Z3A.15 15 WEEKS GESTATION OF PREGNANCY: ICD-10-CM

## 2024-11-04 DIAGNOSIS — Z34.92 ENCOUNTER FOR SUPERVISION OF LOW-RISK PREGNANCY IN SECOND TRIMESTER: Primary | ICD-10-CM

## 2024-11-04 PROCEDURE — 99213 OFFICE O/P EST LOW 20 MIN: CPT | Performed by: PHYSICIAN ASSISTANT

## 2024-12-02 ENCOUNTER — APPOINTMENT (OUTPATIENT)
Dept: LAB | Facility: MEDICAL CENTER | Age: 24
End: 2024-12-02
Payer: MEDICARE

## 2024-12-02 DIAGNOSIS — Z34.92 ENCOUNTER FOR SUPERVISION OF LOW-RISK PREGNANCY IN SECOND TRIMESTER: ICD-10-CM

## 2024-12-02 DIAGNOSIS — Z3A.15 15 WEEKS GESTATION OF PREGNANCY: ICD-10-CM

## 2024-12-02 PROCEDURE — 82105 ALPHA-FETOPROTEIN SERUM: CPT

## 2024-12-02 PROCEDURE — 36415 COLL VENOUS BLD VENIPUNCTURE: CPT

## 2024-12-03 ENCOUNTER — ROUTINE PRENATAL (OUTPATIENT)
Dept: OBGYN CLINIC | Facility: CLINIC | Age: 24
End: 2024-12-03
Payer: MEDICARE

## 2024-12-03 VITALS
HEART RATE: 61 BPM | SYSTOLIC BLOOD PRESSURE: 104 MMHG | OXYGEN SATURATION: 99 % | HEIGHT: 63 IN | WEIGHT: 126 LBS | BODY MASS INDEX: 22.32 KG/M2 | DIASTOLIC BLOOD PRESSURE: 56 MMHG

## 2024-12-03 DIAGNOSIS — Z3A.15 15 WEEKS GESTATION OF PREGNANCY: ICD-10-CM

## 2024-12-03 DIAGNOSIS — Z34.92 ENCOUNTER FOR SUPERVISION OF LOW-RISK PREGNANCY IN SECOND TRIMESTER: Primary | ICD-10-CM

## 2024-12-03 DIAGNOSIS — F32.A DEPRESSION COMPLICATING PREGNANCY IN SECOND TRIMESTER, ANTEPARTUM: ICD-10-CM

## 2024-12-03 DIAGNOSIS — O99.342 DEPRESSION COMPLICATING PREGNANCY IN SECOND TRIMESTER, ANTEPARTUM: ICD-10-CM

## 2024-12-03 PROCEDURE — 99213 OFFICE O/P EST LOW 20 MIN: CPT | Performed by: OBSTETRICS & GYNECOLOGY

## 2024-12-03 NOTE — PROGRESS NOTES
"OB/GYN  PN Visit  Magy Cookjanes  [id number]  12/3/2024  4:31 PM  Dr. Kaelyn Cornejo MD    S: 24 y.o.  20w0d here for PN visit.       Chief Complaint   Patient presents with    Routine Prenatal Visit     20wks         OB complaints:  Contractions: no  Leakage: no  Bleeding: no  Fetal movement: yes      O:  /56 (BP Location: Right arm, Patient Position: Sitting, Cuff Size: Standard)   Pulse 61   Ht 5' 3\" (1.6 m)   Wt 57.2 kg (126 lb)   LMP 2024   SpO2 99%   BMI 22.32 kg/m²       Review of Systems      OBGyn Exam        Pregravid Weight/BMI: 46.3 kg (102 lb) (BMI 18.07)  Current Weight: 57.2 kg (126 lb)   Total Weight Gain: 10.9 kg (24 lb)   Pre-Ana Vitals      Flowsheet Row Most Recent Value   Prenatal Assessment    Prenatal Vitals    Blood Pressure 104/56   Weight - Scale 57.2 kg (126 lb)   Urine Albumin/Glucose    Dilation/Effacement/Station    Vaginal Drainage    Edema              Problem List          Behavioral Health    Generalized anxiety disorder    Moderate episode of recurrent major depressive disorder (HCC)    Depression complicating pregnancy in second trimester, antepartum       Surgery/Wound/Pain    Other headache syndrome       Obstetrics/Gynecology    Encounter for supervision of low-risk pregnancy in second trimester    Overview   Declines flu vaccine          15 weeks gestation of pregnancy    Overview   Overview:  Labs  Pap smear 10/03/2024  and GC/CT completed  Prenatal panel completed  MSAFP [ ordered ]  Genetic screening NIPS done  28w labs [  ]  Vaccines:  Flu vaccine: declines  Covid vaccine:   Tdap vaccine:   RSV vaccine:   RhoGAM not indicated  Contraception:   Feeding plan:   Birth plan:   Delivery consent: to be signed at 28w  Ultrasounds: 1st trimester US completed             Other    Underweight    COVID-19 vaccine dose declined    BMI less than 19,adult      Level 2 US scheduled  AFP pending  Follow-up in 4 weeks  Declines flu vaccine    Future " Appointments   Date Time Provider Department Center   2024  2:30 PM  US 1 [facility]   2024  4:00 PM Kaelyn Cornejo MD Complete  Practice-Wom   2025  9:30 AM Alexx Abarca MD [facility] [place]               Kaelyn Cornejo MD  12/3/2024  4:31 PM

## 2024-12-04 LAB
2ND TRIMESTER 4 SCREEN SERPL-IMP: NORMAL
AFP ADJ MOM SERPL: 1.08
AFP INTERP AMN-IMP: NORMAL
AFP INTERP SERPL-IMP: NORMAL
AFP INTERP SERPL-IMP: NORMAL
AFP SERPL-MCNC: 73.4 NG/ML
AGE AT DELIVERY: 23.2 YR
GA METHOD: NORMAL
GA: 19.9 WEEKS
IDDM PATIENT QL: NO
MULTIPLE PREGNANCY: NO
NEURAL TUBE DEFECT RISK FETUS: 9540 %

## 2024-12-05 ENCOUNTER — RESULTS FOLLOW-UP (OUTPATIENT)
Dept: OBGYN CLINIC | Facility: CLINIC | Age: 24
End: 2024-12-05

## 2024-12-09 ENCOUNTER — ROUTINE PRENATAL (OUTPATIENT)
Dept: PERINATAL CARE | Facility: OTHER | Age: 24
End: 2024-12-09
Payer: MEDICARE

## 2024-12-09 VITALS
DIASTOLIC BLOOD PRESSURE: 60 MMHG | BODY MASS INDEX: 22.96 KG/M2 | SYSTOLIC BLOOD PRESSURE: 120 MMHG | HEIGHT: 63 IN | HEART RATE: 61 BPM | WEIGHT: 129.6 LBS

## 2024-12-09 DIAGNOSIS — O35.2XX0 HEREDITARY DISEASE IN FAMILY POSSIBLY AFFECTING FETUS, AFFECTING MANAGEMENT OF MOTHER IN PREGNANCY, SINGLE OR UNSPECIFIED FETUS: Primary | ICD-10-CM

## 2024-12-09 DIAGNOSIS — Z36.3 ENCOUNTER FOR ANTENATAL SCREENING FOR MALFORMATIONS: ICD-10-CM

## 2024-12-09 DIAGNOSIS — O44.40 LOW LYING PLACENTA WITHOUT HEMORRHAGE, ANTEPARTUM: ICD-10-CM

## 2024-12-09 DIAGNOSIS — Z3A.20 20 WEEKS GESTATION OF PREGNANCY: ICD-10-CM

## 2024-12-09 DIAGNOSIS — Z36.86 ENCOUNTER FOR ANTENATAL SCREENING FOR CERVICAL LENGTH: ICD-10-CM

## 2024-12-09 PROBLEM — Z78.9 NONIMMUNE TO HEPATITIS B VIRUS: Status: ACTIVE | Noted: 2024-12-09

## 2024-12-09 PROCEDURE — 76811 OB US DETAILED SNGL FETUS: CPT | Performed by: OBSTETRICS & GYNECOLOGY

## 2024-12-09 PROCEDURE — 76817 TRANSVAGINAL US OBSTETRIC: CPT | Performed by: OBSTETRICS & GYNECOLOGY

## 2024-12-09 PROCEDURE — 99214 OFFICE O/P EST MOD 30 MIN: CPT | Performed by: OBSTETRICS & GYNECOLOGY

## 2024-12-09 NOTE — PROGRESS NOTES
Magy Sanders  has no complaints today at 20w6d. She reports fetal movements and does not report any vaginal bleeding. She complains of contractions that occur 4 times or more in an hour brought on by intercourse and when she was on her feet for a long time.  Her recently completed fetal testing revealed a normal NIPT and MSAFP. She is here today for an ultrasound for fetal anatomy.  27 pound weight gain.    Problem list:  Low pregravid weight  Son with congenital lung disorder called Nehi (Neuroendocrine cell hyperplasia of infancy).  Symptoms developed at 4 weeks of age and he was diagnosed at 6 weeks of age.  Etiology behind this disorder is not known.    Ultrasound findings:  The ultrasound today shows normal interval fetal growth and fluid, normal cervical length, and no malformations were detected.  Placenta is low-lying at 1.7 cm from the internal os.    Pregnancy ultrasound has limitations and is unable to detect all forms of fetal congenital abnormalities.      Counseling  We discussed that her ultrasound today shows no evidence for a short cervix or  labor in response to these contractions.  She is early though and it is concerning that she develops more then one contraction with intercourse so she may have an increased risk to develop  labor with future episodes. Since this happened twice would avoid intercourse and orgasm to be safe till later in pregnancy when she is near term.     Follow up recommended:   Recommend a 32-week  ultrasound for fetal growth and review of the placental location to prove that the low lying placenta has resolved.    Pre visit time reviewing her records   10 minutes  Face to face time 5 minutes  Post visit time on documentation of note, updating her problem list, adding orders and prescriptions 5 minutes.  Procedures that were completed today were charged separately.   The level of decision making was low complexity.    Cordelia Porter MD

## 2024-12-09 NOTE — PROGRESS NOTES
Ultrasound Probe Disinfection    A transvaginal ultrasound was performed.   Prior to use, disinfection was performed with High Level Disinfection Process (Secure-24on).  Probe serial number [id number]: [id number]  was used.    Chaperone: Talisha Fernández RDMS

## 2024-12-09 NOTE — LETTER
2024     Kaelyn Cornejo MD  [address]  [address]  [place] [address]    Patient: Magy Sanders   YOB: 2000   Date of Visit: 2024       Dear Dr. Cornejo:    Thank you for referring Magy Sanders to me for evaluation. Below are my notes for this consultation.    If you have questions, please do not hesitate to call me. I look forward to following your patient along with you.         Sincerely,        Cordelia Porter MD        CC: No Recipients    Cordelia Porter MD  2024 11:45 PM  Sign when Signing Visit  Magy Sanders  has no complaints today at 20w6d. She reports fetal movements and does not report any vaginal bleeding. She complains of contractions that occur 4 times or more in an hour brought on by intercourse and when she was on her feet for a long time.  Her recently completed fetal testing revealed a normal NIPT and MSAFP. She is here today for an ultrasound for fetal anatomy.  27 pound weight gain.    Problem list:  Low pregravid weight  Son with congenital lung disorder called Nehi (Neuroendocrine cell hyperplasia of infancy).  Symptoms developed at 4 weeks of age and he was diagnosed at 6 weeks of age.  Etiology behind this disorder is not known.    Ultrasound findings:  The ultrasound today shows normal interval fetal growth and fluid, normal cervical length, and no malformations were detected.  Placenta is low-lying at 1.7 cm from the internal os.    Pregnancy ultrasound has limitations and is unable to detect all forms of fetal congenital abnormalities.      Counseling  We discussed that her ultrasound today shows no evidence for a short cervix or  labor in response to these contractions.  She is early though and it is concerning that she develops more then one contraction with intercourse so she may have an increased risk to develop  labor with future episodes. Since this happened twice would avoid intercourse and orgasm  to be safe till later in pregnancy when she is near term.     Follow up recommended:   Recommend a 32-week  ultrasound for fetal growth and review of the placental location to prove that the low lying placenta has resolved.    Pre visit time reviewing her records   10 minutes  Face to face time 5 minutes  Post visit time on documentation of note, updating her problem list, adding orders and prescriptions 5 minutes.  Procedures that were completed today were charged separately.   The level of decision making was low complexity.    Cordelia Porter MD

## 2024-12-23 ENCOUNTER — ROUTINE PRENATAL (OUTPATIENT)
Dept: OBGYN CLINIC | Facility: CLINIC | Age: 24
End: 2024-12-23
Payer: MEDICARE

## 2024-12-23 VITALS
SYSTOLIC BLOOD PRESSURE: 116 MMHG | HEIGHT: 63 IN | OXYGEN SATURATION: 98 % | HEART RATE: 83 BPM | WEIGHT: 134.6 LBS | BODY MASS INDEX: 23.85 KG/M2 | DIASTOLIC BLOOD PRESSURE: 56 MMHG

## 2024-12-23 DIAGNOSIS — O99.342 DEPRESSION COMPLICATING PREGNANCY IN SECOND TRIMESTER, ANTEPARTUM: ICD-10-CM

## 2024-12-23 DIAGNOSIS — O44.40 LOW LYING PLACENTA WITHOUT HEMORRHAGE, ANTEPARTUM: Primary | ICD-10-CM

## 2024-12-23 DIAGNOSIS — O09.92 SUPERVISION OF HIGH RISK PREGNANCY, ANTEPARTUM, SECOND TRIMESTER: ICD-10-CM

## 2024-12-23 DIAGNOSIS — F32.A DEPRESSION COMPLICATING PREGNANCY IN SECOND TRIMESTER, ANTEPARTUM: ICD-10-CM

## 2024-12-23 DIAGNOSIS — Z3A.22 22 WEEKS GESTATION OF PREGNANCY: ICD-10-CM

## 2024-12-23 PROCEDURE — 99213 OFFICE O/P EST LOW 20 MIN: CPT | Performed by: OBSTETRICS & GYNECOLOGY

## 2024-12-23 NOTE — PROGRESS NOTES
"OB/GYN  PN Visit  Magy Sanders  [id number]  2024  4:22 PM  Dr. Kaelyn Cornejo MD    S: 24 y.o.  w6d here for PN visit.       Chief Complaint   Patient presents with    Routine Prenatal Visit     22w6d     She has some contractions after intercourse  And standing on her feet a lot      OB complaints:  Contractions: no  Leakage: no  Bleeding: no  Fetal movement: yes      O:  /56 (BP Location: Right arm, Patient Position: Sitting, Cuff Size: Standard)   Pulse 83   Ht 5' 3\" (1.6 m)   Wt 61.1 kg (134 lb 9.6 oz)   LMP 2024   SpO2 98%   BMI 23.84 kg/m²       Review of Systems   Constitutional: Negative.    HENT: Negative.     Eyes: Negative.    Respiratory: Negative.     Cardiovascular: Negative.    Gastrointestinal: Negative.    Endocrine: Negative.    Genitourinary:         As noted in HPI   Musculoskeletal: Negative.    Skin: Negative.    Allergic/Immunologic: Negative.    Neurological: Negative.    Hematological: Negative.    Psychiatric/Behavioral: Negative.           Physical Exam  Constitutional:       General: She is not in acute distress.     Appearance: Normal appearance. She is well-developed.   Abdominal:      Palpations: Abdomen is soft.      Tenderness: There is no abdominal tenderness. There is no guarding.   Neurological:      Mental Status: She is alert and oriented to person, place, and time.   Skin:     General: Skin is warm and dry.   Psychiatric:         Behavior: Behavior normal.   Vitals and nursing note reviewed.             Pregravid Weight/BMI: 46.3 kg (102 lb) (BMI 18.07)  Current Weight: 61.1 kg (134 lb 9.6 oz)   Total Weight Gain: 14.8 kg (32 lb 9.6 oz)   Pre- Vitals      Flowsheet Row Most Recent Value   Prenatal Assessment    Fetal Heart Rate 155   Fundal Height (cm) 23 cm   Movement Present   Prenatal Vitals    Blood Pressure 116/56   Weight - Scale 61.1 kg (134 lb 9.6 oz)   Urine Albumin/Glucose    Dilation/Effacement/Station    Vaginal Drainage  "   Edema              Problem List          Behavioral Health    Generalized anxiety disorder    Moderate episode of recurrent major depressive disorder (HCC)    Depression complicating pregnancy in second trimester, antepartum       Surgery/Wound/Pain    Other headache syndrome       Obstetrics/Gynecology    Supervision of high risk pregnancy, antepartum, second trimester    Overview   Declines flu vaccine          22 weeks gestation of pregnancy    Overview   Overview:  Labs  Pap smear 10/03/2024  and GC/CT completed  Prenatal panel completed  MSAFP - normal   NIPS done- normal  28w labs [  ]  Vaccines:  Flu vaccine: declines  Covid vaccine: declines  Tdap vaccine:   RSV vaccine:   RhoGAM not indicated  Contraception: none  Feeding plan: breastfeeding  Birth plan:  with or without epidural  Delivery consent: to be signed at 28w  Ultrasounds: 1st trimester US completed, level 2 US, scheduled for 32 weeks US           Low lying placenta without hemorrhage, antepartum       Other    COVID-19 vaccine dose declined    BMI less than 19,adult    Nonimmune to hepatitis B virus    Hereditary disease in family possibly affecting fetus    Overview   Son Jacek has congenital lung disorder called Nehi (Neuroendocrine cell hyperplasia of infancy)           Follow-up in 4 weeks  PTL precautions, advised hydration  24-28 week labs ordered      Future Appointments   Date Time Provider Department Center   2025  4:15 PM Kaelyn Cornejo MD Complete  Practice-Wom   3/3/2025  5:00 PM  US 3 [facility]   2025  9:30 AM Alexx Abarca MD [facility]               Kaelyn Cornejo MD  2024  4:22 PM

## 2025-01-02 ENCOUNTER — HOSPITAL ENCOUNTER (OUTPATIENT)
Facility: HOSPITAL | Age: 25
Discharge: HOME | End: 2025-01-02
Attending: OBSTETRICS & GYNECOLOGY | Admitting: OBSTETRICS & GYNECOLOGY
Payer: MEDICARE

## 2025-01-02 ENCOUNTER — NURSE TRIAGE (OUTPATIENT)
Age: 25
End: 2025-01-02

## 2025-01-02 VITALS
TEMPERATURE: 94.4 F | WEIGHT: 134.6 LBS | HEART RATE: 88 BPM | SYSTOLIC BLOOD PRESSURE: 105 MMHG | BODY MASS INDEX: 23.85 KG/M2 | DIASTOLIC BLOOD PRESSURE: 64 MMHG | RESPIRATION RATE: 16 BRPM | OXYGEN SATURATION: 100 % | HEIGHT: 63 IN

## 2025-01-02 PROBLEM — Z3A.24 24 WEEKS GESTATION OF PREGNANCY: Status: ACTIVE | Noted: 2024-10-30

## 2025-01-02 PROCEDURE — 99213 OFFICE O/P EST LOW 20 MIN: CPT

## 2025-01-02 PROCEDURE — 76815 OB US LIMITED FETUS(S): CPT

## 2025-01-02 PROCEDURE — NC001 PR NO CHARGE: Performed by: OBSTETRICS & GYNECOLOGY

## 2025-01-02 NOTE — PROCEDURES
Magy Sanders, a  at 24w2d with an MARIAMA of 2025, by Last Menstrual Period, was seen at [facility] for the following procedure(s): $Procedure Type: YEVGENIY]         4 Quadrant YEVGENIY  YEVGENIY Q1 (cm): 5.1 cm  YEVGENIY Q2 (cm): 2.3 cm  YEVGENIY Q3 (cm): 2.4 cm  YEVGENIY Q4 (cm): 2.4 cm  YEVGENIY TOTAL (cm): 12.2 cm

## 2025-01-02 NOTE — PROGRESS NOTES
L&D Triage Note - OB/GYN  Magy Sanders 24 y.o. female MRN: [id number]  Unit/Bed#:  TRIAGE  Encounter: [id number]      ASSESSMENT:    Magy Sanders is a 24 y.o.  at 24w2d presenting with leakage of fluid. Rupture workup negative and YEVGENIY wnl. Patient does not appear to be ruptured and it is safe for discharge to home with return precautions.      PLAN:    1) Leakage of fluid  - No pooling, no ferning, negative nitrazine  - YEVGENIY wnl: 12.18 cm    24w2d gestation of pregnancy  - Continue routine prenatal care  - Discharge from OB triage with  labor precautions    - Reviewed rupture of membranes, false vs true labor, decreased fetal movement, and vaginal bleeding   - Pt to call provider with any concerns and follow up at her next scheduled prenatal appointment    - Case discussed with Dr. Everardo Adrian MD  OB/GYN PGY-1  2025 4:48 PM    SUBJECTIVE:    Magy Sanders 24 y.o.  at 24w2d with an Estimated Date of Delivery: 25 who presents to triage for leakage of fluid. Patient denies vaginal bleeding. Patient denies having uterine contractions  Patient denies recent trauma, recent intercourse/objects per vagina, inadequate hydration, and genital lesions. Patient does not have headache, blurry vision, epigastric pain, or edema. Fetal movement is normal.     Her past obstetrical history is significant for  one     ROS:  General: in no apparent distress, well developed and well nourished, and alert  Cardiovascular: normal  HEENT: Denies blurry vision, denies headache  Pulmonary: No cough, chest pain  GI: Denies nausea, vomiting  : Denies urinary symptoms  Lower Extremity: Edema wnl for pregnancy    OBJECTIVE  Vitals:   Vitals:    25 1629   BP: 105/64   Pulse:    Resp:    Temp:    SpO2:      Body mass index is 23.84 kg/m².  GBS: Not Tested  Blood type: A  Estimated Date of Delivery: 25    General Physical Exam:  General: Well appearing,  no acute distress  Cardiovascular: normal  Lungs: non-labored breathing  Abdomen: Soft, Non-tender, Gravid  Lower extremities: Edema wnl for pregnancy    Cervical Exam  Speculum: Cervical os is closed, no pooling, no abnormal discharge, no bleeding    FETAL ASSESSMENT:  Fetal heart rate: Baseline Rate (FHR): 140 bpm  Variability: Moderate  Accelerations: 10 x 10 (<32 weeks)  Decelerations: None  [place]: Contraction Frequency (minutes): 0    Wet mount/KOH: absent clue cells, absent hyphae, absent trichomonads present   Membrane Status: Nitrazine absent, Ferning absent, Pooling absent    Imaging:        Abd. US   YEVGENIY      - Q1 5.13 cm     - Q2 2.25 cm     - Q3 2.39 cm     - Q4 2.41 cm     - Total: 12.18 cm

## 2025-01-02 NOTE — TELEPHONE ENCOUNTER
"24w2d OB, - reports for the past 2 days she has been leaking clear fluid and having extreme hip pain. Notes she has been having contractions as well, feels some improvement with rest but states when she gets up she feels the severe hip pain and that's when the clear fluid leaks out. States if she were to put on a pad, it would fill with the clear fluid- is dripping out of her. When she goes from a lying position to a standing position, denies any pooling of fluid in her vaginal canal. Reports good FM, no VB. Informed patient due to the intensity of her lower pelvic pain, contractions and questionable loss of fluid, would recommend she head over to L&D triage for an eval. Patient agreeable, will go to L&D [facility].     ESC sent to Dr Quintanilla.     Received ESC from Dr Villaseñor - resident at [facility] and notes due to GA, patient to be seen at [facility] instead. Called patient and advised. Also notified [facility] SOD and Charge RN.    Reason for Disposition   Leakage of fluid from vagina    Answer Assessment - Initial Assessment Questions  1. ONSET: \"When did the symptoms begin?\"         2 days ago   2. CONTRACTIONS: \"Describe the contractions that you are having.\" (e.g., duration, frequency, regularity, severity)      Intense cramping, extreme hip pain   3. PREGNANCY: \"How many weeks pregnant are you?\"      24w2d  4. MARIAMA: \"What date are you expecting to deliver?\"      25  5. PARITY: \"Have you had a baby before?\" If Yes, ask: \"How long did the labor last?\"        6. FETAL MOVEMENT: \"Has the baby's movement decreased or changed significantly from normal?\"      Good   7. OTHER SYMPTOMS: \"Do you have any other symptoms?\" (e.g., leaking fluid from vagina, fever, hand/facial swelling)      LOF    Protocols used: Pregnancy - Labor - -Adult-[place]    "

## 2025-01-15 ENCOUNTER — APPOINTMENT (OUTPATIENT)
Dept: LAB | Facility: MEDICAL CENTER | Age: 25
End: 2025-01-15
Payer: MEDICARE

## 2025-01-15 DIAGNOSIS — Z3A.22 22 WEEKS GESTATION OF PREGNANCY: ICD-10-CM

## 2025-01-15 LAB
BASOPHILS # BLD AUTO: 0.07 THOUSANDS/ΜL (ref 0–0.1)
BASOPHILS NFR BLD AUTO: 1 % (ref 0–1)
EOSINOPHIL # BLD AUTO: 0.21 THOUSAND/ΜL (ref 0–0.61)
EOSINOPHIL NFR BLD AUTO: 2 % (ref 0–6)
ERYTHROCYTE [DISTWIDTH] IN BLOOD BY AUTOMATED COUNT: 12.5 % (ref 11.6–15.1)
GLUCOSE 1H P 50 G GLC PO SERPL-MCNC: 61 MG/DL (ref 70–134)
HCT VFR BLD AUTO: 35.4 % (ref 34.8–46.1)
HGB BLD-MCNC: 11.9 G/DL (ref 11.5–15.4)
IMM GRANULOCYTES # BLD AUTO: 0.24 THOUSAND/UL (ref 0–0.2)
IMM GRANULOCYTES NFR BLD AUTO: 2 % (ref 0–2)
LYMPHOCYTES # BLD AUTO: 2.12 THOUSANDS/ΜL (ref 0.6–4.47)
LYMPHOCYTES NFR BLD AUTO: 18 % (ref 14–44)
MCH RBC QN AUTO: 32.1 PG (ref 26.8–34.3)
MCHC RBC AUTO-ENTMCNC: 33.6 G/DL (ref 31.4–37.4)
MCV RBC AUTO: 95 FL (ref 82–98)
MONOCYTES # BLD AUTO: 0.99 THOUSAND/ΜL (ref 0.17–1.22)
MONOCYTES NFR BLD AUTO: 9 % (ref 4–12)
NEUTROPHILS # BLD AUTO: 8.08 THOUSANDS/ΜL (ref 1.85–7.62)
NEUTS SEG NFR BLD AUTO: 68 % (ref 43–75)
NRBC BLD AUTO-RTO: 0 /100 WBCS
PLATELET # BLD AUTO: 177 THOUSANDS/UL (ref 149–390)
PMV BLD AUTO: 12.4 FL (ref 8.9–12.7)
RBC # BLD AUTO: 3.71 MILLION/UL (ref 3.81–5.12)
WBC # BLD AUTO: 11.71 THOUSAND/UL (ref 4.31–10.16)

## 2025-01-15 PROCEDURE — 82950 GLUCOSE TEST: CPT

## 2025-01-15 PROCEDURE — 85025 COMPLETE CBC W/AUTO DIFF WBC: CPT

## 2025-01-15 PROCEDURE — 36415 COLL VENOUS BLD VENIPUNCTURE: CPT

## 2025-01-15 PROCEDURE — 86780 TREPONEMA PALLIDUM: CPT

## 2025-01-16 ENCOUNTER — RESULTS FOLLOW-UP (OUTPATIENT)
Dept: OBGYN CLINIC | Facility: CLINIC | Age: 25
End: 2025-01-16

## 2025-01-16 LAB — TREPONEMA PALLIDUM IGG+IGM AB [PRESENCE] IN SERUM OR PLASMA BY IMMUNOASSAY: NORMAL

## 2025-01-20 PROBLEM — Z3A.26 26 WEEKS GESTATION OF PREGNANCY: Status: ACTIVE | Noted: 2024-10-30

## 2025-01-20 NOTE — PROGRESS NOTES
"OB/GYN  PN Visit  Magy Sanders  [id number]  2025  4:47 PM  Dr. Kaelyn Cornejo MD    S: 25 y.o.  27 1 d here for PN visit.       Chief Complaint   Patient presents with    Routine Prenatal Visit         OB complaints:  Contractions: no  Leakage: no  Bleeding: no  Fetal movement: yes      O:  /66 (BP Location: Right arm, Patient Position: Sitting, Cuff Size: Adult)   Pulse 86   Ht 5' 3\" (1.6 m)   Wt 62.9 kg (138 lb 9.6 oz)   LMP 2024   SpO2 99%   BMI 24.55 kg/m²       Review of Systems   Constitutional: Negative.    HENT: Negative.     Eyes: Negative.    Respiratory: Negative.     Cardiovascular: Negative.    Gastrointestinal: Negative.    Endocrine: Negative.    Genitourinary:         As noted in HPI   Musculoskeletal: Negative.    Skin: Negative.    Allergic/Immunologic: Negative.    Neurological: Negative.    Hematological: Negative.    Psychiatric/Behavioral: Negative.           Physical Exam  Constitutional:       General: She is not in acute distress.     Appearance: Normal appearance. She is well-developed.   Abdominal:      Palpations: Abdomen is soft.      Tenderness: There is no abdominal tenderness. There is no guarding.   Neurological:      Mental Status: She is alert and oriented to person, place, and time.   Skin:     General: Skin is warm and dry.   Psychiatric:         Behavior: Behavior normal.             Pregravid Weight/BMI: 46.3 kg (102 lb) (BMI 18.07)  Current Weight: 62.9 kg (138 lb 9.6 oz)   Total Weight Gain: 16.6 kg (36 lb 9.6 oz)   Pre- Vitals      Flowsheet Row Most Recent Value   Prenatal Assessment    Fetal Heart Rate 154   Fundal Height (cm) 27 cm   Movement Present   Presentation Vertex   Prenatal Vitals    Blood Pressure 122/66   Weight - Scale 62.9 kg (138 lb 9.6 oz)   Urine Albumin/Glucose    Dilation/Effacement/Station    Vaginal Drainage    Edema              Problem List          Behavioral Health    Generalized anxiety disorder    " Moderate episode of recurrent major depressive disorder (HCC)    Depression complicating pregnancy in second trimester, antepartum       Surgery/Wound/Pain    Other headache syndrome       Obstetrics/Gynecology    Supervision of high risk pregnancy, antepartum, second trimester    Overview   Declines flu vaccine          27 weeks gestation of pregnancy    Overview   Overview:  Labs  Pap smear 10/03/2024  and GC/CT completed  Prenatal panel completed  MSAFP - normal   NIPS done- normal  28w labs completed   Vaccines:  Flu vaccine: declines  Covid vaccine: declines  Tdap vaccine:  declines   RSV vaccine: not indicated   RhoGAM not indicated  Contraception: none  Feeding plan: breastfeeding, breast pump sent via DME  Birth plan:  with or without epidural  Delivery consent: to be signed at 28w  Ultrasounds: 1st trimester US completed, level 2 US, scheduled for 32 weeks US           Low lying placenta without hemorrhage, antepartum       Other    COVID-19 vaccine dose declined    BMI less than 19,adult    Nonimmune to hepatitis B virus    Hereditary disease in family possibly affecting fetus    Overview   Son Jacek has congenital lung disorder called Nehi (Neuroendocrine cell hyperplasia of infancy)          Other Visit Diagnoses         Second trimester pregnancy    -  Primary           Declines TDAP  Discussed possible reactive hypoglycemia  Follow-up in 2 weeks    Delivery paperwork given  Consent to procedures spontaneous assisted vaginal delivery or operative vaginal delivery or  birth   Visitation policy reviewed  Birth certificate information mother's work sheet  Birth plan guide   Authorization for release of protected health information for photographers  Birthing room support person rules and acknowledgement  Education  Perineal massage  Kick counts  Last weeks of pregnancy and when to notify the doctor      Follow-up in 2 weeks    Breast pump sent      Future Appointments   Date Time Provider  Department Center   2025 10:15 AM Katei Quintanilla MD [facility]   3/3/2025  5:00 PM  US 3 [facility]   2025  9:30 AM Alexx Abarca MD [facility]               Kaelyn Cornejo MD  2025  4:47 PM

## 2025-01-22 ENCOUNTER — ROUTINE PRENATAL (OUTPATIENT)
Dept: OBGYN CLINIC | Facility: CLINIC | Age: 25
End: 2025-01-22
Payer: MEDICARE

## 2025-01-22 VITALS
HEIGHT: 63 IN | SYSTOLIC BLOOD PRESSURE: 122 MMHG | OXYGEN SATURATION: 99 % | BODY MASS INDEX: 24.56 KG/M2 | WEIGHT: 138.6 LBS | DIASTOLIC BLOOD PRESSURE: 66 MMHG | HEART RATE: 86 BPM

## 2025-01-22 DIAGNOSIS — Z78.9 NONIMMUNE TO HEPATITIS B VIRUS: ICD-10-CM

## 2025-01-22 DIAGNOSIS — Z34.92 SECOND TRIMESTER PREGNANCY: Primary | ICD-10-CM

## 2025-01-22 DIAGNOSIS — O99.342 DEPRESSION COMPLICATING PREGNANCY IN SECOND TRIMESTER, ANTEPARTUM: ICD-10-CM

## 2025-01-22 DIAGNOSIS — O44.40 LOW LYING PLACENTA WITHOUT HEMORRHAGE, ANTEPARTUM: ICD-10-CM

## 2025-01-22 DIAGNOSIS — O09.92 SUPERVISION OF HIGH RISK PREGNANCY, ANTEPARTUM, SECOND TRIMESTER: ICD-10-CM

## 2025-01-22 DIAGNOSIS — O35.2XX0 HEREDITARY DISEASE IN FAMILY POSSIBLY AFFECTING FETUS, AFFECTING MANAGEMENT OF MOTHER IN PREGNANCY, SINGLE OR UNSPECIFIED FETUS: ICD-10-CM

## 2025-01-22 DIAGNOSIS — F32.A DEPRESSION COMPLICATING PREGNANCY IN SECOND TRIMESTER, ANTEPARTUM: ICD-10-CM

## 2025-01-22 DIAGNOSIS — Z3A.27 27 WEEKS GESTATION OF PREGNANCY: ICD-10-CM

## 2025-01-22 PROCEDURE — 99213 OFFICE O/P EST LOW 20 MIN: CPT | Performed by: OBSTETRICS & GYNECOLOGY

## 2025-01-24 LAB
DME PARACHUTE DELIVERY DATE REQUESTED: NORMAL
DME PARACHUTE ITEM DESCRIPTION: NORMAL
DME PARACHUTE ORDER STATUS: NORMAL
DME PARACHUTE SUPPLIER NAME: NORMAL
DME PARACHUTE SUPPLIER PHONE: NORMAL

## 2025-02-11 ENCOUNTER — TELEPHONE (OUTPATIENT)
Dept: OBGYN CLINIC | Facility: CLINIC | Age: 25
End: 2025-02-11

## 2025-02-11 ENCOUNTER — ROUTINE PRENATAL (OUTPATIENT)
Dept: OBGYN CLINIC | Facility: CLINIC | Age: 25
End: 2025-02-11
Payer: MEDICARE

## 2025-02-11 VITALS
WEIGHT: 145.2 LBS | SYSTOLIC BLOOD PRESSURE: 118 MMHG | DIASTOLIC BLOOD PRESSURE: 56 MMHG | OXYGEN SATURATION: 98 % | BODY MASS INDEX: 25.73 KG/M2 | HEART RATE: 103 BPM | HEIGHT: 63 IN

## 2025-02-11 DIAGNOSIS — Z3A.30 30 WEEKS GESTATION OF PREGNANCY: Primary | ICD-10-CM

## 2025-02-11 DIAGNOSIS — O09.93 SUPERVISION OF HIGH RISK PREGNANCY IN THIRD TRIMESTER: ICD-10-CM

## 2025-02-11 PROCEDURE — 99213 OFFICE O/P EST LOW 20 MIN: CPT | Performed by: OBSTETRICS & GYNECOLOGY

## 2025-02-11 NOTE — PROGRESS NOTES
"    S: 25 y.o.  30w0d here for routine prenatal visit.        Chief Complaint   Patient presents with    Routine Prenatal Visit         OB complaints:  Contractions: no  Leakage: no  Bleeding: no  Fetal movement: yes      O:  /56 (BP Location: Right arm, Patient Position: Sitting, Cuff Size: Adult)   Pulse 103   Ht 5' 3\" (1.6 m)   Wt 65.9 kg (145 lb 3.2 oz)   LMP 2024   SpO2 98%   BMI 25.72 kg/m²       Review of Systems   Constitutional:  Negative for chills and fever.   Eyes:  Negative for visual disturbance.   Respiratory:  Negative for chest tightness and shortness of breath.    Cardiovascular:  Negative for chest pain.   Gastrointestinal:  Negative for abdominal pain, diarrhea, nausea and vomiting.   Genitourinary:  Negative for pelvic pain and vaginal bleeding.        As noted in HPI   Skin:  Negative for rash.   Neurological:  Negative for headaches.   All other systems reviewed and are negative.        Physical Exam  Constitutional:       General: She is not in acute distress.     Appearance: Normal appearance.   HENT:      Head: Normocephalic and atraumatic.   Cardiovascular:      Rate and Rhythm: Normal rate.   Pulmonary:      Effort: Pulmonary effort is normal. No respiratory distress.   Abdominal:      General: There is no distension.      Palpations: Abdomen is soft.      Tenderness: There is no abdominal tenderness. There is no guarding or rebound.      Comments: gravid   Neurological:      General: No focal deficit present.      Mental Status: She is alert.   Psychiatric:         Mood and Affect: Mood normal.         Behavior: Behavior normal.   Vitals and nursing note reviewed.           Fundal Height (cm): 30 cm  Fetal Heart Rate: 145    Pregravid Weight/BMI: 46.3 kg (102 lb) (BMI 18.07)  Current Weight: 65.9 kg (145 lb 3.2 oz)   Total Weight Gain: 19.6 kg (43 lb 3.2 oz)     Pre-Ana Vitals      Flowsheet Row Most Recent Value   Prenatal Assessment    Fetal Heart Rate 145 "   Fundal Height (cm) 30 cm   Movement Present   Prenatal Vitals    Blood Pressure 118/56   Weight - Scale 65.9 kg (145 lb 3.2 oz)   Urine Albumin/Glucose    Dilation/Effacement/Station    Vaginal Drainage    Edema                     Problem List       COVID-19 vaccine dose declined    Generalized anxiety disorder    BMI less than 19,adult    Other headache syndrome    Moderate episode of recurrent major depressive disorder (HCC)    Supervision of high risk pregnancy in third trimester    Overview   Overview:  Labs  Pap smear 10/03/2024  and GC/CT completed  Prenatal panel completed  MSAFP - normal   NIPS done- normal  28w labs completed   Vaccines:  Flu vaccine: declines  Covid vaccine: declines  Tdap vaccine:  declines   RSV vaccine: not indicated   RhoGAM not indicated  Contraception: none  Feeding plan: breastfeeding, breast pump sent via DME  Birth plan:  with or without epidural  Delivery consent: signed  Ultrasounds: 1st trimester US completed, level 2 US, scheduled for 32 weeks US         Current Assessment & Plan   Delivery consent reviewed and signed  Growth US scheduled  OB precautions reviewed         Depression complicating pregnancy in second trimester, antepartum    30 weeks gestation of pregnancy    Nonimmune to hepatitis B virus    Hereditary disease in family possibly affecting fetus    Overview   Son Jacek has congenital lung disorder called Nehi (Neuroendocrine cell hyperplasia of infancy)         Low lying placenta without hemorrhage, antepartum                         Katie Quintanilla MD  2025  10:26 AM

## 2025-02-11 NOTE — TELEPHONE ENCOUNTER
Attempted to call pt unable to leave voicemail to return call for 3rd trimester assessment as patient has calling restrictions placed.

## 2025-02-21 NOTE — TELEPHONE ENCOUNTER
2ND TRIMESTER CHECK-IN CALL      Overall how are you doing? Patient is doing well     Compliant with routine OB care appointments?   yes    Have you completed your 1st trimester labs?   yes     If you had NIPS with MFM, do you have a order for MSAFP?  Patient aware can be completed 15w-22w6d but ideally 16w-18w.  yes     Have you seen MFM and do you have your detailed US scheduled?   yes     Pregnancy Education-have you had a chance to review the classes offered and registered?   no         3RD TRIMESTER CHECK-IN CALL      Overall how are you feeling? Patient doing well    Compliant with routine OB appointments? yes    Have you completed your 3rd trimester lab work? yes    Have you reviewed the contents of 3rd trimester folder from office? yes    Have you decided on a pediatrician?   If yes, who dr. Fer messer.      Questions on the baby birth certificate forms? No    Patient would just like breast pump script- pt requesting copy to send to supplier.      Link for the Hospital Readiness Video sent via [employer] w/3rd Trimester message

## 2025-02-25 PROBLEM — Z3A.32 32 WEEKS GESTATION OF PREGNANCY: Status: ACTIVE | Noted: 2024-10-30

## 2025-02-25 PROBLEM — O99.343 DEPRESSION AFFECTING PREGNANCY IN THIRD TRIMESTER, ANTEPARTUM: Status: ACTIVE | Noted: 2024-10-07

## 2025-02-25 NOTE — PROGRESS NOTES
"OB/GYN  PN Visit  Magy Sanders  [id number]  2025  9:43 AM  Dr. Kaelyn Cornejo MD    S: 25 y.o.  32w1d here for PN visit.       Chief Complaint   Patient presents with    Routine Prenatal Visit         OB complaints:  Contractions: irregular, pressure    Leakage: no  Bleeding: no  Fetal movement: yes      O:  /64 (BP Location: Right arm, Patient Position: Sitting, Cuff Size: Adult)   Pulse 101   Ht 5' 3\" (1.6 m)   Wt 67.5 kg (148 lb 12.8 oz)   LMP 2024   SpO2 98%   BMI 26.36 kg/m²       Review of Systems   Constitutional: Negative.    HENT: Negative.     Eyes: Negative.    Respiratory: Negative.     Cardiovascular: Negative.    Gastrointestinal: Negative.    Endocrine: Negative.    Genitourinary:         As noted in HPI   Musculoskeletal: Negative.    Skin: Negative.    Allergic/Immunologic: Negative.    Neurological: Negative.    Hematological: Negative.    Psychiatric/Behavioral: Negative.           Physical Exam  Constitutional:       General: She is not in acute distress.     Appearance: Normal appearance. She is well-developed.   Abdominal:      Palpations: Abdomen is soft.      Tenderness: There is no abdominal tenderness. There is no guarding.   Neurological:      Mental Status: She is alert and oriented to person, place, and time.   Skin:     General: Skin is warm and dry.   Psychiatric:         Behavior: Behavior normal.             Pregravid Weight/BMI: 46.3 kg (102 lb) (BMI 18.07)  Current Weight: 67.5 kg (148 lb 12.8 oz)   Total Weight Gain: 21.2 kg (46 lb 12.8 oz)   Pre- Vitals      Flowsheet Row Most Recent Value   Prenatal Assessment    Fetal Heart Rate 140   Fundal Height (cm) 31 cm   Movement Present   Prenatal Vitals    Blood Pressure 112/64   Weight - Scale 67.5 kg (148 lb 12.8 oz)   Urine Albumin/Glucose    Dilation/Effacement/Station    Vaginal Drainage    Edema              Problem List          Behavioral Health    Generalized anxiety disorder    " Moderate episode of recurrent major depressive disorder (HCC)    Depression affecting pregnancy in third trimester, antepartum       Surgery/Wound/Pain    Other headache syndrome       Obstetrics/Gynecology    Supervision of high risk pregnancy in third trimester    Overview   Overview:  Labs  Pap smear 10/03/2024  and GC/CT completed  Prenatal panel completed  MSAFP - normal   NIPS done- normal  28w labs completed   Vaccines:  Flu vaccine: declines  Covid vaccine: declines  Tdap vaccine:  declines   RSV vaccine: not indicated   RhoGAM not indicated  Contraception: none  Feeding plan: breastfeeding, breast pump sent via DME  Birth plan:  with or without epidural  Delivery consent: signed  Ultrasounds: 1st trimester US completed, level 2 US, scheduled for 32 weeks US         32 weeks gestation of pregnancy    Low lying placenta without hemorrhage, antepartum       Other    COVID-19 vaccine dose declined    BMI less than 19,adult    Nonimmune to hepatitis B virus    Hereditary disease in family possibly affecting fetus    Overview   Son Jacek has congenital lung disorder called Nehi (Neuroendocrine cell hyperplasia of infancy)          Other Visit Diagnoses         Third trimester pregnancy    -  Primary           Advised maternity bekt for pelvic pressure, declines pelvic exam  Will; work on breast pump form  Follow-up in 2 weeks        Future Appointments   Date Time Provider Department Center   3/3/2025  5:00 PM  US 3 [facility]   2025  9:30 AM Alexx Abarca MD [facility]               Kaelyn Cornejo MD  2025  9:43 AM

## 2025-02-26 ENCOUNTER — ROUTINE PRENATAL (OUTPATIENT)
Dept: OBGYN CLINIC | Facility: CLINIC | Age: 25
End: 2025-02-26
Payer: MEDICARE

## 2025-02-26 VITALS
WEIGHT: 148.8 LBS | DIASTOLIC BLOOD PRESSURE: 64 MMHG | SYSTOLIC BLOOD PRESSURE: 112 MMHG | HEART RATE: 101 BPM | OXYGEN SATURATION: 98 % | HEIGHT: 63 IN | BODY MASS INDEX: 26.36 KG/M2

## 2025-02-26 DIAGNOSIS — O99.343 DEPRESSION AFFECTING PREGNANCY IN THIRD TRIMESTER, ANTEPARTUM: ICD-10-CM

## 2025-02-26 DIAGNOSIS — F32.A DEPRESSION AFFECTING PREGNANCY IN THIRD TRIMESTER, ANTEPARTUM: ICD-10-CM

## 2025-02-26 DIAGNOSIS — Z3A.32 32 WEEKS GESTATION OF PREGNANCY: ICD-10-CM

## 2025-02-26 DIAGNOSIS — Z34.93 THIRD TRIMESTER PREGNANCY: Primary | ICD-10-CM

## 2025-02-26 PROCEDURE — 99213 OFFICE O/P EST LOW 20 MIN: CPT | Performed by: OBSTETRICS & GYNECOLOGY

## 2025-03-03 ENCOUNTER — ULTRASOUND (OUTPATIENT)
Dept: PERINATAL CARE | Facility: OTHER | Age: 25
End: 2025-03-03
Payer: MEDICARE

## 2025-03-03 VITALS
SYSTOLIC BLOOD PRESSURE: 110 MMHG | HEIGHT: 63 IN | HEART RATE: 102 BPM | BODY MASS INDEX: 26.79 KG/M2 | DIASTOLIC BLOOD PRESSURE: 72 MMHG | WEIGHT: 151.2 LBS

## 2025-03-03 DIAGNOSIS — F32.A DEPRESSION AFFECTING PREGNANCY IN THIRD TRIMESTER, ANTEPARTUM: ICD-10-CM

## 2025-03-03 DIAGNOSIS — Z3A.32 32 WEEKS GESTATION OF PREGNANCY: ICD-10-CM

## 2025-03-03 DIAGNOSIS — Z36.89 ENCOUNTER FOR ULTRASOUND TO ASSESS FETAL GROWTH: ICD-10-CM

## 2025-03-03 DIAGNOSIS — Z03.72 PLACENTAL PROBLEM SUSPECTED BUT NOT FOUND: Primary | ICD-10-CM

## 2025-03-03 DIAGNOSIS — O99.343 DEPRESSION AFFECTING PREGNANCY IN THIRD TRIMESTER, ANTEPARTUM: ICD-10-CM

## 2025-03-03 PROBLEM — O44.40 LOW LYING PLACENTA WITHOUT HEMORRHAGE, ANTEPARTUM: Status: RESOLVED | Noted: 2024-12-09 | Resolved: 2025-03-03

## 2025-03-03 PROCEDURE — 76816 OB US FOLLOW-UP PER FETUS: CPT | Performed by: OBSTETRICS & GYNECOLOGY

## 2025-03-03 PROCEDURE — 99213 OFFICE O/P EST LOW 20 MIN: CPT | Performed by: NURSE PRACTITIONER

## 2025-03-03 PROCEDURE — 76817 TRANSVAGINAL US OBSTETRIC: CPT | Performed by: OBSTETRICS & GYNECOLOGY

## 2025-03-03 NOTE — LETTER
March 3, 2025     Katie Quintanilla MD  [address]  [address]  [place] [phone]    Patient: Magy Sanders   YOB: 2000   Date of Visit: 3/3/2025       Dear Dr. Quintanilla:    Thank you for referring Magy Sanders to me for evaluation. Below are my notes for this consultation.    If you have questions, please do not hesitate to call me. I look forward to following your patient along with you.         Sincerely,        Cordelia Porter MD        CC: No Recipients    Cordelia Porter MD  3/3/2025  6:33 PM  Sign when Signing Visit  Magy Sanders has no complaints today.  She reports regular fetal movements and does not report any problems.  She is here today at 32w6d  for evaluation of fetal weight and amniotic fluid. Her glucola was 61 on 1/15/25.         Problem list:  1. Low lying placenta -resolved  2. Depression -no medication      Ultrasound findings:  A viable young intrauterine pregnancy is seen. Estimated fetal weight and amniotic fluid are within normal limits for gestational age. Growth is at the 26th precentile. No fetal anomalies are visualized on limited survey. Placenta is within normal limits.         Pregnancy ultrasound has limitations and is unable to detect all forms of fetal congenital abnormalities.  The inaccuracy in the EFW can be off by 1 lb either way in the third trimester.     Specific counseling was provided on the following problems:  1. Her 1 hour Glucola screen was reviewed.   2. She was reassured that the prior low lying placenta is resolved.      Follow up recommended:   1. No further ultrasounds are recommended at this time. Fetal kickcounting was recommended.  Routine prenatal care is advised. Please refer her back to our office as clinically indicated.     Split-shared decision-making between ANISH Bloom and myself was utilized, with the majority of the time spent by ENRIQUE Bloom  Medical decision-making for this encounter was  low (diagnosis low, data limited and risk low).    Procedures that were completed today were charged separately.     I reviewed the ultrasound pictures and recommended the medical decision making transcribed in the care of this patient.      Cordelia Porter MD

## 2025-03-03 NOTE — PROGRESS NOTES
Ultrasound Probe Disinfection    A transvaginal ultrasound was performed.   Prior to use, disinfection was performed with High Level Disinfection Process (LP33.TV).  Probe serial number [id number] was used.    Amina ROOT, GENIE  03/03/25  5:09 PM

## 2025-03-03 NOTE — PROGRESS NOTES
Magy Sanders has no complaints today.  She reports regular fetal movements and does not report any problems.  She is here today at 32w6d  for evaluation of fetal weight and amniotic fluid. Her glucola was 61 on 1/15/25.         Problem list:  1. Low lying placenta -resolved  2. Depression -no medication      Ultrasound findings:  A viable young intrauterine pregnancy is seen. Estimated fetal weight and amniotic fluid are within normal limits for gestational age. Growth is at the 26th precentile. No fetal anomalies are visualized on limited survey. Placenta is within normal limits.         Pregnancy ultrasound has limitations and is unable to detect all forms of fetal congenital abnormalities.  The inaccuracy in the EFW can be off by 1 lb either way in the third trimester.     Specific counseling was provided on the following problems:  1. Her 1 hour Glucola screen was reviewed.   2. She was reassured that the prior low lying placenta is resolved.      Follow up recommended:   1. No further ultrasounds are recommended at this time. Fetal kickcounting was recommended.  Routine prenatal care is advised. Please refer her back to our office as clinically indicated.     Split-shared decision-making between ANISH Bloom and myself was utilized, with the majority of the time spent by ENRIQUE Bloom  Medical decision-making for this encounter was low (diagnosis low, data limited and risk low).    Procedures that were completed today were charged separately.     I reviewed the ultrasound pictures and recommended the medical decision making transcribed in the care of this patient.      Cordelia Porter MD

## 2025-03-11 PROBLEM — Z3A.34 34 WEEKS GESTATION OF PREGNANCY: Status: ACTIVE | Noted: 2024-10-30

## 2025-03-14 ENCOUNTER — ROUTINE PRENATAL (OUTPATIENT)
Dept: OBGYN CLINIC | Facility: CLINIC | Age: 25
End: 2025-03-14
Payer: MEDICARE

## 2025-03-14 VITALS
BODY MASS INDEX: 27.14 KG/M2 | SYSTOLIC BLOOD PRESSURE: 110 MMHG | HEIGHT: 63 IN | WEIGHT: 153.2 LBS | DIASTOLIC BLOOD PRESSURE: 58 MMHG | HEART RATE: 78 BPM | OXYGEN SATURATION: 98 %

## 2025-03-14 DIAGNOSIS — F32.A DEPRESSION AFFECTING PREGNANCY IN THIRD TRIMESTER, ANTEPARTUM: ICD-10-CM

## 2025-03-14 DIAGNOSIS — Z3A.34 34 WEEKS GESTATION OF PREGNANCY: ICD-10-CM

## 2025-03-14 DIAGNOSIS — O99.343 DEPRESSION AFFECTING PREGNANCY IN THIRD TRIMESTER, ANTEPARTUM: ICD-10-CM

## 2025-03-14 DIAGNOSIS — O09.93 SUPERVISION OF HIGH RISK PREGNANCY IN THIRD TRIMESTER: Primary | ICD-10-CM

## 2025-03-14 PROCEDURE — 99213 OFFICE O/P EST LOW 20 MIN: CPT | Performed by: OBSTETRICS & GYNECOLOGY

## 2025-03-14 NOTE — PROGRESS NOTES
"    S: 25 y.o.  34w3d here for routine prenatal visit.        Chief Complaint   Patient presents with    Routine Prenatal Visit     34w3d         OB complaints:  Contractions: no  Leakage: no  Bleeding: no  Fetal movement: yes      O:  /58 (BP Location: Right arm, Patient Position: Sitting, Cuff Size: Standard)   Pulse 78   Ht 5' 3\" (1.6 m)   Wt 69.5 kg (153 lb 3.2 oz)   LMP 2024   SpO2 98%   BMI 27.14 kg/m²       Review of Systems   Constitutional:  Negative for chills and fever.   Eyes:  Negative for visual disturbance.   Respiratory:  Negative for chest tightness and shortness of breath.    Cardiovascular:  Negative for chest pain.   Gastrointestinal:  Negative for abdominal pain, diarrhea, nausea and vomiting.   Genitourinary:  Negative for pelvic pain and vaginal bleeding.        As noted in HPI   Skin:  Negative for rash.   Neurological:  Negative for headaches.   All other systems reviewed and are negative.        Physical Exam  Constitutional:       General: She is not in acute distress.     Appearance: Normal appearance.   HENT:      Head: Normocephalic and atraumatic.   Cardiovascular:      Rate and Rhythm: Normal rate.   Pulmonary:      Effort: Pulmonary effort is normal. No respiratory distress.   Abdominal:      General: There is no distension.      Palpations: Abdomen is soft.      Tenderness: There is no abdominal tenderness. There is no guarding or rebound.      Comments: gravid   Neurological:      General: No focal deficit present.      Mental Status: She is alert.   Psychiatric:         Mood and Affect: Mood normal.         Behavior: Behavior normal.   Vitals and nursing note reviewed.           Fundal Height (cm): 34 cm  Fetal Heart Rate: 145    Pregravid Weight/BMI: 46.3 kg (102 lb) (BMI 18.07)  Current Weight: 69.5 kg (153 lb 3.2 oz)   Total Weight Gain: 23.2 kg (51 lb 3.2 oz)     Pre-Ana Vitals      Flowsheet Row Most Recent Value   Prenatal Assessment    Fetal Heart " Rate 145   Fundal Height (cm) 34 cm   Movement Present   Prenatal Vitals    Blood Pressure 110/58   Weight - Scale 69.5 kg (153 lb 3.2 oz)   Urine Albumin/Glucose    Dilation/Effacement/Station    Vaginal Drainage    Edema                     Problem List       COVID-19 vaccine dose declined    Generalized anxiety disorder    BMI less than 19,adult    Other headache syndrome    Moderate episode of recurrent major depressive disorder (HCC)    Supervision of high risk pregnancy in third trimester    Overview   Overview:  Labs  Pap smear 10/03/2024  and GC/CT completed  Prenatal panel completed  MSAFP - normal   NIPS done- normal  28w labs completed   Vaccines:  Flu vaccine: declines  Covid vaccine: declines  Tdap vaccine:  declines   RSV vaccine: not indicated   RhoGAM not indicated  Contraception: none  Feeding plan: breastfeeding, breast pump sent via DME  Birth plan:  with or without epidural  Delivery consent: signed  Ultrasounds: 1st trimester US completed, level 2 US, scheduled for 32 weeks US - EFW 26th%         Current Assessment & Plan   Recent growth US normal EFW 26th%. Low lying placenta resolved  OB precautions reviewed         Depression affecting pregnancy in third trimester, antepartum    34 weeks gestation of pregnancy    Nonimmune to hepatitis B virus    Hereditary disease in family possibly affecting fetus    Overview   Son Jacek has congenital lung disorder called Nehi (Neuroendocrine cell hyperplasia of infancy)                              Katie Quintanilla MD  3/14/2025  9:51 AM

## 2025-03-24 ENCOUNTER — TELEPHONE (OUTPATIENT)
Age: 25
End: 2025-03-24

## 2025-03-24 NOTE — TELEPHONE ENCOUNTER
PT called in regarding OB appointment 3/27 in [facility]. She would like to go to the [place] location and wasn't aware this was scheduled in [facility]. Patient would like to know if we can get her in this week in other location. There was nothing on the schedule please follow up.

## 2025-03-27 ENCOUNTER — ROUTINE PRENATAL (OUTPATIENT)
Dept: OBGYN CLINIC | Facility: CLINIC | Age: 25
End: 2025-03-27
Payer: MEDICARE

## 2025-03-27 VITALS
DIASTOLIC BLOOD PRESSURE: 64 MMHG | BODY MASS INDEX: 27.96 KG/M2 | HEART RATE: 88 BPM | HEIGHT: 63 IN | SYSTOLIC BLOOD PRESSURE: 124 MMHG | WEIGHT: 157.8 LBS | OXYGEN SATURATION: 96 %

## 2025-03-27 DIAGNOSIS — Z3A.36 36 WEEKS GESTATION OF PREGNANCY: Primary | ICD-10-CM

## 2025-03-27 PROCEDURE — 87150 DNA/RNA AMPLIFIED PROBE: CPT | Performed by: OBSTETRICS & GYNECOLOGY

## 2025-03-27 PROCEDURE — 99213 OFFICE O/P EST LOW 20 MIN: CPT | Performed by: OBSTETRICS & GYNECOLOGY

## 2025-03-27 NOTE — PROGRESS NOTES
"OB/GYN  PN Visit  Magy Sanders  [id number]  3/27/2025  10:44 AM  Dr. Kaelyn Cornejo MD    S: 25 y.o.  36w2d here for PN visit.       Chief Complaint   Patient presents with    Routine Prenatal Visit     She has been having some cramping  States that baby feels low      OB complaints:  Contractions: no  Leakage: no  Bleeding: no  Fetal movement: yes      O:  /64 (BP Location: Right arm, Patient Position: Sitting, Cuff Size: Adult)   Pulse 88   Ht 5' 3\" (1.6 m)   Wt 71.6 kg (157 lb 12.8 oz)   LMP 2024   SpO2 96%   BMI 27.95 kg/m²       Review of Systems   Constitutional: Negative.    HENT: Negative.     Eyes: Negative.    Respiratory: Negative.     Cardiovascular: Negative.    Gastrointestinal: Negative.    Endocrine: Negative.    Genitourinary:         As noted in HPI   Musculoskeletal: Negative.    Skin: Negative.    Allergic/Immunologic: Negative.    Neurological: Negative.    Hematological: Negative.    Psychiatric/Behavioral: Negative.           Physical Exam  Constitutional:       General: She is not in acute distress.     Appearance: Normal appearance. She is well-developed.   Abdominal:      Palpations: Abdomen is soft.      Tenderness: There is no abdominal tenderness. There is no guarding.   Neurological:      Mental Status: She is alert and oriented to person, place, and time.   Skin:     General: Skin is warm and dry.   Psychiatric:         Behavior: Behavior normal.             Pregravid Weight/BMI: 46.3 kg (102 lb) (BMI 18.07)  Current Weight: 71.6 kg (157 lb 12.8 oz)   Total Weight Gain: 25.3 kg (55 lb 12.8 oz)   Pre-Ana Vitals      Flowsheet Row Most Recent Value   Prenatal Assessment    Fetal Heart Rate 158   Fundal Height (cm) 36 cm   Movement Present   Presentation Vertex   Prenatal Vitals    Blood Pressure 124/64   Weight - Scale 71.6 kg (157 lb 12.8 oz)   Urine Albumin/Glucose    Dilation/Effacement/Station    Cervical Dilation 0   Cervical Effacement 50   Fetal " Station -3   Vaginal Drainage    Edema              Problem List          Behavioral Health    Generalized anxiety disorder    Moderate episode of recurrent major depressive disorder (HCC)    Depression affecting pregnancy in third trimester, antepartum       Surgery/Wound/Pain    Other headache syndrome       Obstetrics/Gynecology    Supervision of high risk pregnancy in third trimester    Overview   Overview:  Labs  Pap smear 10/03/2024  and GC/CT completed  Prenatal panel completed  MSAFP - normal   NIPS done- normal  28w labs completed   Vaccines:  Flu vaccine: declines  Covid vaccine: declines  Tdap vaccine:  declines   RSV vaccine: not indicated   RhoGAM not indicated  Contraception: none  Feeding plan: breastfeeding, breast pump sent via DME  Birth plan:  with or without epidural  Delivery consent: signed  Ultrasounds: 1st trimester US completed, level 2 US, scheduled for 32 weeks US - EFW 26th%         34 weeks gestation of pregnancy       Other    COVID-19 vaccine dose declined    BMI less than 19,adult    Nonimmune to hepatitis B virus    Hereditary disease in family possibly affecting fetus    Overview   Son Jacek has congenital lung disorder called Nehi (Neuroendocrine cell hyperplasia of infancy)          Other Visit Diagnoses         36 weeks gestation of pregnancy    -  Primary           GBS was collected.  Follow-up in 1 week.  Patient advised to use maternity  belt for pelvic pressure.      Future Appointments   Date Time Provider Department Center   2025 10:15 AM Katie Quintanilla MD Complete  [facility]   2025  4:15 PM Kaelyn Cornejo MD Complete  [facility]   2025  4:15 PM Kaelyn Cornejo MD Complete  [facility]   2025  9:30 AM MD [facility] [place]               Kaelyn Cornejo MD  3/27/2025  10:44 AM

## 2025-03-29 ENCOUNTER — RESULTS FOLLOW-UP (OUTPATIENT)
Dept: OTHER | Facility: HOSPITAL | Age: 25
End: 2025-03-29

## 2025-03-29 LAB — GP B STREP DNA SPEC QL NAA+PROBE: NEGATIVE

## 2025-04-01 PROBLEM — Z3A.37 37 WEEKS GESTATION OF PREGNANCY: Status: ACTIVE | Noted: 2024-10-30

## 2025-04-02 ENCOUNTER — ROUTINE PRENATAL (OUTPATIENT)
Dept: OBGYN CLINIC | Facility: CLINIC | Age: 25
End: 2025-04-02
Payer: MEDICARE

## 2025-04-02 VITALS
DIASTOLIC BLOOD PRESSURE: 76 MMHG | OXYGEN SATURATION: 97 % | HEIGHT: 63 IN | SYSTOLIC BLOOD PRESSURE: 118 MMHG | HEART RATE: 66 BPM | WEIGHT: 158.6 LBS | BODY MASS INDEX: 28.1 KG/M2

## 2025-04-02 DIAGNOSIS — Z3A.37 37 WEEKS GESTATION OF PREGNANCY: ICD-10-CM

## 2025-04-02 DIAGNOSIS — O09.93 SUPERVISION OF HIGH RISK PREGNANCY IN THIRD TRIMESTER: Primary | ICD-10-CM

## 2025-04-02 DIAGNOSIS — O99.343 DEPRESSION AFFECTING PREGNANCY IN THIRD TRIMESTER, ANTEPARTUM: ICD-10-CM

## 2025-04-02 DIAGNOSIS — F32.A DEPRESSION AFFECTING PREGNANCY IN THIRD TRIMESTER, ANTEPARTUM: ICD-10-CM

## 2025-04-02 PROCEDURE — 99213 OFFICE O/P EST LOW 20 MIN: CPT | Performed by: OBSTETRICS & GYNECOLOGY

## 2025-04-02 NOTE — ASSESSMENT & PLAN NOTE
SVE as above  She is considering elective IOL at 39 weeks - briefly reviewed  OB precautions reviewed

## 2025-04-02 NOTE — PROGRESS NOTES
"    S: 25 y.o.  37w1d here for routine prenatal visit.        Chief Complaint   Patient presents with    Routine Prenatal Visit     37w1d         OB complaints:  Contractions: irregular, not painful. Some more pelvic pressure   Leakage: no  Bleeding: no  Fetal movement: yes      O:  /76 (BP Location: Right arm, Patient Position: Sitting, Cuff Size: Standard)   Pulse 66   Ht 5' 3\" (1.6 m)   Wt 71.9 kg (158 lb 9.6 oz)   LMP 2024   SpO2 97%   BMI 28.09 kg/m²       Review of Systems   Constitutional:  Negative for chills and fever.   Eyes:  Negative for visual disturbance.   Respiratory:  Negative for chest tightness and shortness of breath.    Cardiovascular:  Negative for chest pain.   Gastrointestinal:  Negative for abdominal pain, diarrhea, nausea and vomiting.   Genitourinary:  Negative for pelvic pain and vaginal bleeding.        As noted in HPI   Skin:  Negative for rash.   Neurological:  Negative for headaches.   All other systems reviewed and are negative.        Physical Exam  Constitutional:       General: She is not in acute distress.     Appearance: Normal appearance.   Genitourinary:      Right Labia: No rash, tenderness, lesions or skin changes.     Left Labia: No tenderness, lesions, skin changes or rash.     Pelvic exam was performed with patient in the lithotomy position.   HENT:      Head: Normocephalic and atraumatic.   Cardiovascular:      Rate and Rhythm: Normal rate.   Pulmonary:      Effort: Pulmonary effort is normal. No respiratory distress.   Abdominal:      General: There is no distension.      Palpations: Abdomen is soft.      Tenderness: There is no abdominal tenderness. There is no guarding or rebound.      Comments: gravid   Neurological:      General: No focal deficit present.      Mental Status: She is alert.   Psychiatric:         Mood and Affect: Mood normal.         Behavior: Behavior normal.   Vitals and nursing note reviewed.           Fundal Height (cm): " 37 cm  Fetal Heart Rate: 140    Pregravid Weight/BMI: 46.3 kg (102 lb) (BMI 18.07)  Current Weight: 71.9 kg (158 lb 9.6 oz)   Total Weight Gain: 25.7 kg (56 lb 9.6 oz)     Pre-Ana Vitals      Flowsheet Row Most Recent Value   Prenatal Assessment    Fetal Heart Rate 140   Fundal Height (cm) 37 cm   Movement Present   Presentation Vertex   Prenatal Vitals    Blood Pressure 118/76   Weight - Scale 71.9 kg (158 lb 9.6 oz)   Urine Albumin/Glucose    Dilation/Effacement/Station    Cervical Dilation 1   Cervical Effacement 50   Fetal Station -3   Vaginal Drainage    Edema                     Problem List       COVID-19 vaccine dose declined    Generalized anxiety disorder    BMI less than 19,adult    Other headache syndrome    Moderate episode of recurrent major depressive disorder (HCC)    Supervision of high risk pregnancy in third trimester    Overview   Overview:  Labs  Pap smear 10/03/2024  and GC/CT completed  Prenatal panel completed  MSAFP - normal   NIPS done- normal  28w labs completed   Vaccines:  Flu vaccine: declines  Covid vaccine: declines  Tdap vaccine:  declines   RSV vaccine: not indicated   RhoGAM not indicated  Contraception: none  Feeding plan: breastfeeding, breast pump sent via DME  Birth plan:  with or without epidural  Delivery consent: signed  Ultrasounds: 1st trimester US completed, level 2 US, scheduled for 32 weeks US - EFW 26th%         Current Assessment & Plan   SVE as above  She is considering elective IOL at 39 weeks - briefly reviewed  OB precautions reviewed         Depression affecting pregnancy in third trimester, antepartum    37 weeks gestation of pregnancy    Nonimmune to hepatitis B virus    Hereditary disease in family possibly affecting fetus    Overview   Son Jacek has congenital lung disorder called Nehi (Neuroendocrine cell hyperplasia of infancy)                              Katie Quintanilla MD  2025  10:31 AM

## 2025-04-05 ENCOUNTER — NURSE TRIAGE (OUTPATIENT)
Dept: OTHER | Facility: OTHER | Age: 25
End: 2025-04-05

## 2025-04-05 ENCOUNTER — HOSPITAL ENCOUNTER (OUTPATIENT)
Facility: HOSPITAL | Age: 25
Discharge: HOME | End: 2025-04-05
Attending: OBSTETRICS & GYNECOLOGY | Admitting: OBSTETRICS & GYNECOLOGY
Payer: MEDICARE

## 2025-04-05 VITALS — WEIGHT: 159 LBS | BODY MASS INDEX: 28.17 KG/M2 | HEIGHT: 63 IN

## 2025-04-05 PROBLEM — O47.9 BRAXTON HICKS CONTRACTIONS: Status: ACTIVE | Noted: 2025-04-05

## 2025-04-05 PROCEDURE — NC001 PR NO CHARGE: Performed by: OBSTETRICS & GYNECOLOGY

## 2025-04-05 PROCEDURE — 59025 FETAL NON-STRESS TEST: CPT

## 2025-04-05 PROCEDURE — 99212 OFFICE O/P EST SF 10 MIN: CPT

## 2025-04-05 NOTE — TELEPHONE ENCOUNTER
"FOLLOW UP: None    REASON FOR CONVERSATION: Contractions    SYMPTOMS: ESC sent to on call-  37w4d, MARIAMA 4/22. Patient notes she has been having abdominal cramping all day however for the past 3 hours cramping has become worse and is occurring every 5-10 min. Also notes increase in lower back pain and pelvic pressure. Back pain radiates into her lower stomach. Also with increased right foot/lower leg swelling. Denies leaking of fluid or bleeding. 2nd baby. Normal fetal movement.     OTHER: None    DISPOSITION: Go to [facility]      Reason for Disposition   [1] History of prior delivery (multipara) AND [2] contractions < 10 minutes apart AND [3] present 1 hour    Answer Assessment - Initial Assessment Questions  1. ONSET: \"When did the symptoms begin?\"         Has been experiencing abdominal cramping all day, however the past 3 hours cramping has been occurring every 5-10 min    3. PREGNANCY: \"How many weeks pregnant are you?\"      37w4d    4. MARIAMA: \"What date are you expecting to deliver?\"      4/22    5. PARITY: \"Have you had a baby before?\" If Yes, ask: \"How long did the labor last?\"      2nd baby    6. FETAL MOVEMENT: \"Has the baby's movement decreased or changed significantly from normal?\"      Normal    7. OTHER SYMPTOMS: \"Do you have any other symptoms?\" (e.g., leaking fluid from vagina, vaginal bleeding, fever, hand/facial swelling)      Increase in back pain and pelvic pressure    Protocols used: Pregnancy - Labor-Adult-AH    "

## 2025-04-05 NOTE — TELEPHONE ENCOUNTER
"Regardin weeks pregnant/contractions/burning hip back pain  ----- Message from Elise PARKS sent at 2025  6:38 PM EDT -----  Pt stated, \"I am 37 weeks pregnant and I have been having contractions all day. I haven't really been timing them because I have been busy. I am having hip and back pain that feels like it is burning.\"    "

## 2025-04-06 NOTE — PROCEDURES
Magy Sanders, a  at 37w4d with an MARIAMA of 2025, by Last Menstrual Period, was seen at [facility] for the following procedure(s): $Procedure Type: NST]    Nonstress Test  Reason for NST: Routine  Variability: Moderate  Decelerations: None  Accelerations: Yes  Acoustic Stimulator: No  Baseline: 130 BPM  Uterine Irritability: No  Contractions: Irregular    Interpretation  Nonstress Test Interpretation: Reactive  Overall Impression: Reassuring        Zenia Noel MD   PGY-2, OBGYN  2025  9:57 PM

## 2025-04-06 NOTE — PROGRESS NOTES
"L&D Triage Note - OB/GYN  Magy Sanders 25 y.o. female MRN: [id number]  Unit/Bed#:  TRIAGE  Encounter: [id number]    Patient is seen by [facility]    ASSESSMENT/PLAN  Magy Sanders is a 25 y.o.  at 37w4d presenting with irregular uterine contractions and period-like cramping. FHT with 2 contractions in 20 min. SVE 50/-3 (unchanged from exam on ). Right leg is mildly swollen compared to left without pitting edema, redness, pain, or warmth. Kelly's sign negative. Low suspicion for DVT.  Low suspicion for labor as workup is negative. Stable for discharge at this time. Return precautions given.     Discharge instructions  Patient instructed to call if experiencing worsening contractions, vaginal bleeding, loss of fluid or decreased fetal movement.  Will follow up with OBGYN on 25.    D/w Dr. Landry  ______________    SUBJECTIVE    MARIAMA: Estimated Date of Delivery: 25    HPI:  Magy is a 24 yo  at 37w4d here with her primary complaint of period-like cramping and contractions. She has had cramping all day but started to notice more contraction-like pain this evening. They are irregular ranging from every 5-20 minutes. The cramping radiates to her back.    She also noticed her right leg is more swollen than her left. Denies history of BP issues or prior diagnosis of blood pressure disorder. Denies HA, vision changes, or RUQ pain.    Contractions: Irregular  Leakage of fluid: denies  Vaginal Bleeding: denies  Fetal movement: present    Her obstetrical history is significant for prior term     ROS:  Constitutional: Negative  Respiratory: Negative  Cardiovascular: Negative    Gastrointestinal: Negative    OBJECTIVE:    Vitals:  Ht 5' 3\" (1.6 m)   Wt 72.1 kg (159 lb)   LMP 2024   BMI 28.17 kg/m²   Body mass index is 28.17 kg/m².    Physical Exam  Vitals reviewed. Exam conducted with a chaperone present.   Constitutional:       General: She is not in acute distress.     " Appearance: Normal appearance.   HENT:      Head: Normocephalic and atraumatic.      Nose: Nose normal.      Mouth/Throat:      Pharynx: Oropharynx is clear.   Eyes:      Conjunctiva/sclera: Conjunctivae normal.   Cardiovascular:      Rate and Rhythm: Normal rate.   Pulmonary:      Effort: Pulmonary effort is normal.   Abdominal:      Palpations: Abdomen is soft.      Tenderness: There is no abdominal tenderness. There is no guarding.      Comments: Gravid, fundal height appropriate for gestational age   Genitourinary:     Comments: Normal external female genitalia  Musculoskeletal:      Right lower leg: Swelling present. No tenderness. No edema.      Comments: R calf with mild swelling compared to left. No edema, warmth, erythema, or tenderness. Kelly's sign negative bilaterally   Skin:     General: Skin is warm and dry.      Coloration: Skin is not jaundiced.   Neurological:      Mental Status: She is alert.   Psychiatric:         Mood and Affect: Mood normal.         Behavior: Behavior normal.        SVE:  1/50/-3  Method: Manual  OB Examiner: Sadie    FHT:  Baseline Rate (FHR): 130 bpm  Variability: Moderate  Accelerations: 15 x 15 or greater, At variable times  Decelerations: None  NST reactive    TOCO:   Contraction Frequency (minutes): irregular  Contraction Duration (seconds): 50-70  Contraction Intensity: Mild/Moderate      Labs: No results found for this or any previous visit (from the past 24 hours).        Zenia Noel MD  4/5/2025  9:53 PM

## 2025-04-07 PROBLEM — Z3A.38 38 WEEKS GESTATION OF PREGNANCY: Status: ACTIVE | Noted: 2024-10-30

## 2025-04-07 NOTE — PROGRESS NOTES
"OB/GYN  PN Visit  Magy Sanders  [id number]  2025  4:28 PM  Dr. Kaelyn Cornejo MD    S: 25 y.o.  38  d here for PN visit.       Chief Complaint   Patient presents with    Routine Prenatal Visit         OB complaints:  Contractions: painful contractions  Leakage: no  Bleeding: no  Fetal movement: yes      O:  /64 (BP Location: Right arm, Patient Position: Sitting, Cuff Size: Adult)   Ht 5' 3\" (1.6 m)   Wt 73.2 kg (161 lb 6.4 oz)   LMP 2024   BMI 28.59 kg/m²       Review of Systems   Constitutional: Negative.    HENT: Negative.     Eyes: Negative.    Respiratory: Negative.     Cardiovascular: Negative.    Gastrointestinal: Negative.    Endocrine: Negative.    Genitourinary:         As noted in HPI   Musculoskeletal: Negative.    Skin: Negative.    Allergic/Immunologic: Negative.    Neurological: Negative.    Hematological: Negative.    Psychiatric/Behavioral: Negative.           Physical Exam  Constitutional:       General: She is not in acute distress.     Appearance: Normal appearance. She is well-developed.   Genitourinary:      Right Labia: No rash, tenderness, lesions, skin changes or Bartholin's cyst.     Left Labia: No tenderness, lesions, skin changes, Bartholin's cyst or rash.     No labial fusion noted.      No inguinal adenopathy present in the right or left side.     Pelvic exam was performed with patient in the lithotomy position.   HENT:      Head: Normocephalic.   Cardiovascular:      Rate and Rhythm: Normal rate.   Pulmonary:      Effort: Pulmonary effort is normal.   Abdominal:      General: There is no distension.      Palpations: Abdomen is soft.      Tenderness: There is no abdominal tenderness. There is no guarding.      Hernia: There is no hernia in the left inguinal area or right inguinal area.   Musculoskeletal:         General: No swelling or deformity.   Lymphadenopathy:      Lower Body: No right inguinal adenopathy. No left inguinal adenopathy. "   Neurological:      General: No focal deficit present.      Mental Status: She is alert and oriented to person, place, and time.   Skin:     General: Skin is warm and dry.   Psychiatric:         Mood and Affect: Mood normal.         Behavior: Behavior normal.   Vitals reviewed.             Pregravid Weight/BMI: 46.3 kg (102 lb) (BMI 18.07)  Current Weight: 73.2 kg (161 lb 6.4 oz)   Total Weight Gain: 26.9 kg (59 lb 6.4 oz)   Pre- Vitals      Flowsheet Row Most Recent Value   Prenatal Assessment    Fetal Heart Rate 140   Fundal Height (cm) 37 cm   Movement Present   Presentation Vertex   Prenatal Vitals    Blood Pressure 120/64   Weight - Scale 73.2 kg (161 lb 6.4 oz)   Urine Albumin/Glucose    Dilation/Effacement/Station    Cervical Dilation 2.5   Cervical Effacement 70   Fetal Station -2   Vaginal Drainage    Edema              Problem List          Behavioral Health    Generalized anxiety disorder    Moderate episode of recurrent major depressive disorder (HCC)    Depression affecting pregnancy in third trimester, antepartum       Surgery/Wound/Pain    Other headache syndrome       Obstetrics/Gynecology    Supervision of high risk pregnancy in third trimester    Overview   Overview:  Labs  Pap smear 10/03/2024  and GC/CT completed  Prenatal panel completed  MSAFP - normal   NIPS done- normal  28w labs completed   Vaccines:  Flu vaccine: declines  Covid vaccine: declines  Tdap vaccine:  declines   RSV vaccine: not indicated   RhoGAM not indicated  Contraception: none  Feeding plan: breastfeeding, breast pump sent via DME  Birth plan:  with or without epidural  Delivery consent: signed  Ultrasounds: 1st trimester US completed, level 2 US, scheduled for 32 weeks US - EFW 26th%         38 weeks gestation of pregnancy    [place] contractions       Other    COVID-19 vaccine dose declined    BMI less than 19,adult    Nonimmune to hepatitis B virus    Hereditary disease in family possibly affecting fetus     Overview   Son Jacek has congenital lung disorder called Nehi (Neuroendocrine cell hyperplasia of infancy)          Other Visit Diagnoses         Third trimester pregnancy    -  Primary           With irregular contractions, pelvic pressure and general discomfort.  She is requesting induction of labor after 39 weeks.  She was scheduled for April 15, 2025.  Induction consent was signed today. She was advised she did make some cervical change but not yet in active labor.  Her labor precautions were advised.  I did offer recheck/observation in the hospital which she declines and would like to observe at home and to call if her contractions become closer together.  Follow-up 4 weeks for 3-week postpartum visit.    Future Appointments   Date Time Provider Department Center   4/17/2025  4:15 PM Kaelyn Cornejo MD Complete  Practice-Wom   4/25/2025  9:30 AM MD [facility]CP [place]               Kaelyn Cornejo MD  4/9/2025  4:28 PM

## 2025-04-09 ENCOUNTER — ROUTINE PRENATAL (OUTPATIENT)
Dept: OBGYN CLINIC | Facility: CLINIC | Age: 25
End: 2025-04-09
Payer: MEDICARE

## 2025-04-09 VITALS
BODY MASS INDEX: 28.6 KG/M2 | DIASTOLIC BLOOD PRESSURE: 64 MMHG | WEIGHT: 161.4 LBS | HEIGHT: 63 IN | SYSTOLIC BLOOD PRESSURE: 120 MMHG

## 2025-04-09 DIAGNOSIS — Z78.9 NONIMMUNE TO HEPATITIS B VIRUS: ICD-10-CM

## 2025-04-09 DIAGNOSIS — Z34.93 THIRD TRIMESTER PREGNANCY: Primary | ICD-10-CM

## 2025-04-09 DIAGNOSIS — F32.A DEPRESSION AFFECTING PREGNANCY IN THIRD TRIMESTER, ANTEPARTUM: ICD-10-CM

## 2025-04-09 DIAGNOSIS — O99.343 DEPRESSION AFFECTING PREGNANCY IN THIRD TRIMESTER, ANTEPARTUM: ICD-10-CM

## 2025-04-09 DIAGNOSIS — O09.93 SUPERVISION OF HIGH RISK PREGNANCY IN THIRD TRIMESTER: ICD-10-CM

## 2025-04-09 DIAGNOSIS — Z3A.38 38 WEEKS GESTATION OF PREGNANCY: ICD-10-CM

## 2025-04-09 PROCEDURE — 99214 OFFICE O/P EST MOD 30 MIN: CPT | Performed by: OBSTETRICS & GYNECOLOGY

## 2025-04-12 ENCOUNTER — NURSE TRIAGE (OUTPATIENT)
Dept: OTHER | Facility: OTHER | Age: 25
End: 2025-04-12

## 2025-04-12 ENCOUNTER — HOSPITAL ENCOUNTER (OUTPATIENT)
Facility: HOSPITAL | Age: 25
Discharge: HOME | End: 2025-04-12
Attending: OBSTETRICS & GYNECOLOGY | Admitting: OBSTETRICS & GYNECOLOGY
Payer: MEDICARE

## 2025-04-12 VITALS
DIASTOLIC BLOOD PRESSURE: 76 MMHG | WEIGHT: 161 LBS | HEART RATE: 96 BPM | SYSTOLIC BLOOD PRESSURE: 132 MMHG | BODY MASS INDEX: 28.53 KG/M2 | HEIGHT: 63 IN | RESPIRATION RATE: 18 BRPM

## 2025-04-12 PROBLEM — O26.899 ABDOMINAL CRAMPING AFFECTING PREGNANCY: Status: ACTIVE | Noted: 2025-04-12

## 2025-04-12 PROBLEM — R10.9 ABDOMINAL CRAMPING AFFECTING PREGNANCY: Status: ACTIVE | Noted: 2025-04-12

## 2025-04-12 PROCEDURE — 99212 OFFICE O/P EST SF 10 MIN: CPT

## 2025-04-12 PROCEDURE — NC001 PR NO CHARGE: Performed by: OBSTETRICS & GYNECOLOGY

## 2025-04-12 NOTE — TELEPHONE ENCOUNTER
"FOLLOW UP: none    REASON FOR CONVERSATION: Contractions    SYMPTOMS: contractions and vaginal bleeding    OTHER: n/a    DISPOSITION: Go to  Now. Advised patient to go to [facility] unit for eval. She verbalized understanding. On call provider and charge RN notified.       Reason for Disposition   [1] History of prior delivery (multipara) AND [2] contractions < 10 minutes apart AND [3] present 1 hour    Answer Assessment - Initial Assessment Questions  1. ONSET: \"When did the symptoms begin?\"           4/11 at 11pm     2. CONTRACTIONS: \"Describe the contractions that you are having.\" (e.g., duration, frequency, regularity, severity)        Contractions are waking her up out of sleep, feels uncomfortable.  Irregular lasting 30 seconds. 7-10 minutes    3. PREGNANCY: \"How many weeks pregnant are you?\"        38w4d    4. MARIAMA: \"What date are you expecting to deliver?\"        4/22/25    5. PARITY: \"Have you had a baby before?\" If Yes, ask: \"How long did the labor last?\"        Yes    6. FETAL MOVEMENT: \"Has the baby's movement decreased or changed significantly from normal?\"     Good FM    7. OTHER SYMPTOMS: \"Do you have any other symptoms?\" (e.g., leaking fluid from vagina, vaginal bleeding, fever, hand/facial swelling)       Vaginal bleeding when she wipes and in toilet    Protocols used: Pregnancy - Labor-Adult-AH    "

## 2025-04-12 NOTE — PROGRESS NOTES
L&D Triage Note - OB/GYN  Magy Sanders 25 y.o. female MRN: [id number]  Unit/Bed#: -01 Encounter: [id number]      ASSESSMENT:    Magy Sanders is a 25 y.o.  at 38w4d who was evaluated today in triage for contractions. SVE unchanged at 2.5/70/-2 with one contraction on tocometer. The patient does not appear to be in labor and it is safe to discharge home.     PLAN:    1) Contractions  - SVE: unchanged from prior exam on 25, 2.5/70/-2  - [place]: one contraction in twenty minutes with irritability  - Encouraged PO hydration, warm bath/shower, reassurance    2) 38 weeks gestation of pregnancy  - Continue routine prenatal care  - Discharge from OB triage with labor precautions    - Reviewed rupture of membranes, false vs true labor, decreased fetal movement, and vaginal bleeding   - Pt to call provider with any concerns and follow up at her next scheduled prenatal appointment    - Case discussed with Dr. Longo    SUBJECTIVE:    Magy Sanders 25 y.o.  at 38w4d with an Estimated Date of Delivery: 25 presenting with irregular contractions after having recent intercourse. She reports having minimal spotting. Denies having leakage of fluid or decreased fetal movement.      Her past obstetrical history is significant for 1 . This pregnancy has been complicated by depression, MANDY, Hepatitis B non-immune status.    Contractions: endorses  Leakage of fluid: Denies  Vaginal Bleeding: endorses minimal spotting   Fetal movement: present    OBJECTIVE:    Vitals:    25 1934   BP: 132/76   Pulse: 96   Resp: 18       ROS:  Constitutional: Negative  Respiratory: Negative  Cardiovascular: Negative    Gastrointestinal: positive for abdominal cramping    General Physical Exam:  General: Well appearing, no distress  Respiratory: Unlabored breathing  Cardiovascular: Regular rate.  Abdomen: Soft, gravid, nontender  Fundal Height: Appropriate for gestational age.  Extremities: Warm and well  perfused.  Non tender.      Fetal monitoring:  Fetal heart rate: Baseline Rate (FHR): 125 bpm  Variability: Moderate  Accelerations: 15 x 15 or greater  [place]: Contraction Frequency (minutes): irregular  Contraction Duration (seconds): 40-50  Contraction Intensity: Mild      Cervical Exam  SVE: 2.5/70/-2        Joselito Wnin MD  4/12/2025  8:09 PM

## 2025-04-12 NOTE — TELEPHONE ENCOUNTER
"Regarding: Bleeding with Pregnancy  ----- Message from Armando SMYTH sent at 4/12/2025  5:59 PM EDT -----  \" I am calling to get advice. I am 38 weeks pregnant and I noticed bleeding last night. My contractions are irregular right now, every 7 or 10 minutes, 30 seconds long.\"    "

## 2025-04-13 ENCOUNTER — HOSPITAL ENCOUNTER (INPATIENT)
Facility: HOSPITAL | Age: 25
LOS: 1 days | Discharge: HOME | End: 2025-04-14
Attending: OBSTETRICS & GYNECOLOGY | Admitting: OBSTETRICS & GYNECOLOGY
Payer: MEDICARE

## 2025-04-13 ENCOUNTER — ANESTHESIA EVENT (INPATIENT)
Dept: ANESTHESIOLOGY | Facility: HOSPITAL | Age: 25
End: 2025-04-13
Payer: MEDICARE

## 2025-04-13 ENCOUNTER — ANESTHESIA (INPATIENT)
Dept: ANESTHESIOLOGY | Facility: HOSPITAL | Age: 25
End: 2025-04-13
Payer: MEDICARE

## 2025-04-13 DIAGNOSIS — Z3A.38 38 WEEKS GESTATION OF PREGNANCY: Primary | ICD-10-CM

## 2025-04-13 DIAGNOSIS — O13.9 GESTATIONAL HYPERTENSION: ICD-10-CM

## 2025-04-13 PROBLEM — Z37.9 NORMAL LABOR: Status: ACTIVE | Noted: 2025-04-13

## 2025-04-13 LAB
ABO GROUP BLD: NORMAL
ALBUMIN SERPL BCG-MCNC: 3.8 G/DL (ref 3.5–5)
ALP SERPL-CCNC: 150 U/L (ref 34–104)
ALT SERPL W P-5'-P-CCNC: 13 U/L (ref 7–52)
ANION GAP SERPL CALCULATED.3IONS-SCNC: 9 MMOL/L (ref 4–13)
AST SERPL W P-5'-P-CCNC: 17 U/L (ref 13–39)
BASE EXCESS BLDCOA CALC-SCNC: -8.9 MMOL/L (ref 3–11)
BASE EXCESS BLDCOV CALC-SCNC: -8.8 MMOL/L (ref 1–9)
BILIRUB SERPL-MCNC: 0.31 MG/DL (ref 0.2–1)
BLD GP AB SCN SERPL QL: NEGATIVE
BUN SERPL-MCNC: 11 MG/DL (ref 5–25)
CALCIUM SERPL-MCNC: 9.3 MG/DL (ref 8.4–10.2)
CHLORIDE SERPL-SCNC: 105 MMOL/L (ref 96–108)
CO2 SERPL-SCNC: 23 MMOL/L (ref 21–32)
CREAT SERPL-MCNC: 0.66 MG/DL (ref 0.6–1.3)
ERYTHROCYTE [DISTWIDTH] IN BLOOD BY AUTOMATED COUNT: 13.2 % (ref 11.6–15.1)
GFR SERPL CREATININE-BSD FRML MDRD: 124 ML/MIN/1.73SQ M
GLUCOSE SERPL-MCNC: 100 MG/DL (ref 65–140)
HCO3 BLDCOA-SCNC: 18.5 MMOL/L (ref 17.3–27.3)
HCO3 BLDCOV-SCNC: 16.7 MMOL/L (ref 12.2–28.6)
HCT VFR BLD AUTO: 37.1 % (ref 34.8–46.1)
HGB BLD-MCNC: 12.8 G/DL (ref 11.5–15.4)
MCH RBC QN AUTO: 31.1 PG (ref 26.8–34.3)
MCHC RBC AUTO-ENTMCNC: 34.5 G/DL (ref 31.4–37.4)
MCV RBC AUTO: 90 FL (ref 82–98)
O2 CT VFR BLDCOA CALC: 12.1 ML/DL
OXYHGB MFR BLDCOA: 55.1 %
OXYHGB MFR BLDCOV: 84.5 %
PCO2 BLDCOA: 45.3 MM[HG] (ref 30–60)
PCO2 BLDCOV: 35.3 MM HG (ref 27–43)
PH BLDCOA: 7.23 [PH] (ref 7.23–7.43)
PH BLDCOV: 7.29 [PH] (ref 7.19–7.49)
PLATELET # BLD AUTO: 150 THOUSANDS/UL (ref 149–390)
PMV BLD AUTO: 11.6 FL (ref 8.9–12.7)
PO2 BLDCOA: 27.4 MM HG (ref 5–25)
PO2 BLDCOV: 44.8 MM HG (ref 15–45)
POTASSIUM SERPL-SCNC: 4.4 MMOL/L (ref 3.5–5.3)
PROT SERPL-MCNC: 7 G/DL (ref 6.4–8.4)
RBC # BLD AUTO: 4.12 MILLION/UL (ref 3.81–5.12)
RH BLD: POSITIVE
SAO2 % BLDCOV: 18.8 ML/DL
SODIUM SERPL-SCNC: 137 MMOL/L (ref 135–147)
SPECIMEN EXPIRATION DATE: NORMAL
TREPONEMA PALLIDUM IGG+IGM AB [PRESENCE] IN SERUM OR PLASMA BY IMMUNOASSAY: NORMAL
WBC # BLD AUTO: 13.22 THOUSAND/UL (ref 4.31–10.16)

## 2025-04-13 PROCEDURE — 82805 BLOOD GASES W/O2 SATURATION: CPT | Performed by: OBSTETRICS & GYNECOLOGY

## 2025-04-13 PROCEDURE — 4A1HXCZ MONITORING OF PRODUCTS OF CONCEPTION, CARDIAC RATE, EXTERNAL APPROACH: ICD-10-PCS | Performed by: OBSTETRICS & GYNECOLOGY

## 2025-04-13 PROCEDURE — NC001 PR NO CHARGE: Performed by: OBSTETRICS & GYNECOLOGY

## 2025-04-13 PROCEDURE — 59409 OBSTETRICAL CARE: CPT | Performed by: OBSTETRICS & GYNECOLOGY

## 2025-04-13 PROCEDURE — 86901 BLOOD TYPING SEROLOGIC RH(D): CPT | Performed by: OBSTETRICS & GYNECOLOGY

## 2025-04-13 PROCEDURE — 85027 COMPLETE CBC AUTOMATED: CPT | Performed by: OBSTETRICS & GYNECOLOGY

## 2025-04-13 PROCEDURE — 86780 TREPONEMA PALLIDUM: CPT | Performed by: OBSTETRICS & GYNECOLOGY

## 2025-04-13 PROCEDURE — 80053 COMPREHEN METABOLIC PANEL: CPT | Performed by: OBSTETRICS & GYNECOLOGY

## 2025-04-13 PROCEDURE — 86850 RBC ANTIBODY SCREEN: CPT | Performed by: OBSTETRICS & GYNECOLOGY

## 2025-04-13 PROCEDURE — 99211 OFF/OP EST MAY X REQ PHY/QHP: CPT

## 2025-04-13 PROCEDURE — 86900 BLOOD TYPING SEROLOGIC ABO: CPT | Performed by: OBSTETRICS & GYNECOLOGY

## 2025-04-13 RX ORDER — SODIUM CHLORIDE, SODIUM LACTATE, POTASSIUM CHLORIDE, CALCIUM CHLORIDE 600; 310; 30; 20 MG/100ML; MG/100ML; MG/100ML; MG/100ML
125 INJECTION, SOLUTION INTRAVENOUS CONTINUOUS
Status: DISCONTINUED | OUTPATIENT
Start: 2025-04-13 | End: 2025-04-13

## 2025-04-13 RX ORDER — ACETAMINOPHEN 325 MG/1
650 TABLET ORAL EVERY 4 HOURS PRN
Status: DISCONTINUED | OUTPATIENT
Start: 2025-04-13 | End: 2025-04-14 | Stop reason: HOSPADM

## 2025-04-13 RX ORDER — BENZOCAINE/MENTHOL 6 MG-10 MG
1 LOZENGE MUCOUS MEMBRANE DAILY PRN
Status: CANCELLED
Start: 2025-04-13

## 2025-04-13 RX ORDER — DIPHENHYDRAMINE HYDROCHLORIDE 50 MG/ML
12.5 INJECTION, SOLUTION INTRAMUSCULAR; INTRAVENOUS EVERY 6 HOURS PRN
Status: DISCONTINUED | OUTPATIENT
Start: 2025-04-13 | End: 2025-04-14 | Stop reason: HOSPADM

## 2025-04-13 RX ORDER — ONDANSETRON 2 MG/ML
4 INJECTION INTRAMUSCULAR; INTRAVENOUS EVERY 8 HOURS PRN
Status: DISCONTINUED | OUTPATIENT
Start: 2025-04-13 | End: 2025-04-13 | Stop reason: SDUPTHER

## 2025-04-13 RX ORDER — ROPIVACAINE HYDROCHLORIDE 2 MG/ML
INJECTION, SOLUTION EPIDURAL; INFILTRATION; PERINEURAL
Status: COMPLETED | OUTPATIENT
Start: 2025-04-13 | End: 2025-04-13

## 2025-04-13 RX ORDER — OXYTOCIN/RINGER'S LACTATE 30/500 ML
PLASTIC BAG, INJECTION (ML) INTRAVENOUS
Status: DISPENSED
Start: 2025-04-13 | End: 2025-04-13

## 2025-04-13 RX ORDER — IBUPROFEN 600 MG/1
600 TABLET, FILM COATED ORAL EVERY 6 HOURS
Status: DISCONTINUED | OUTPATIENT
Start: 2025-04-13 | End: 2025-04-14 | Stop reason: HOSPADM

## 2025-04-13 RX ORDER — SIMETHICONE 80 MG
80 TABLET,CHEWABLE ORAL 4 TIMES DAILY PRN
Status: CANCELLED
Start: 2025-04-13

## 2025-04-13 RX ORDER — BUPIVACAINE HYDROCHLORIDE 2.5 MG/ML
30 INJECTION, SOLUTION EPIDURAL; INFILTRATION; INTRACAUDAL; PERINEURAL ONCE AS NEEDED
Status: DISCONTINUED | OUTPATIENT
Start: 2025-04-13 | End: 2025-04-14 | Stop reason: HOSPADM

## 2025-04-13 RX ORDER — CALCIUM CARBONATE 500 MG/1
1000 TABLET, CHEWABLE ORAL DAILY PRN
Status: DISCONTINUED | OUTPATIENT
Start: 2025-04-13 | End: 2025-04-14 | Stop reason: HOSPADM

## 2025-04-13 RX ORDER — CALCIUM CARBONATE 500 MG/1
1000 TABLET, CHEWABLE ORAL DAILY PRN
Status: CANCELLED
Start: 2025-04-13

## 2025-04-13 RX ORDER — BENZOCAINE/MENTHOL 6 MG-10 MG
1 LOZENGE MUCOUS MEMBRANE DAILY PRN
Status: DISCONTINUED | OUTPATIENT
Start: 2025-04-13 | End: 2025-04-14 | Stop reason: HOSPADM

## 2025-04-13 RX ORDER — ONDANSETRON 2 MG/ML
4 INJECTION INTRAMUSCULAR; INTRAVENOUS EVERY 6 HOURS PRN
Status: DISCONTINUED | OUTPATIENT
Start: 2025-04-13 | End: 2025-04-14 | Stop reason: HOSPADM

## 2025-04-13 RX ORDER — OXYTOCIN/RINGER'S LACTATE 30/500 ML
250 PLASTIC BAG, INJECTION (ML) INTRAVENOUS ONCE
Status: COMPLETED | OUTPATIENT
Start: 2025-04-13 | End: 2025-04-13

## 2025-04-13 RX ORDER — IBUPROFEN 600 MG/1
600 TABLET, FILM COATED ORAL EVERY 6 HOURS
Qty: 20 TABLET | Refills: 0 | Status: CANCELLED | OUTPATIENT
Start: 2025-04-13 | End: 2025-04-18

## 2025-04-13 RX ORDER — ACETAMINOPHEN 325 MG/1
975 TABLET ORAL EVERY 6 HOURS
Qty: 60 TABLET | Refills: 0 | Status: CANCELLED | OUTPATIENT
Start: 2025-04-13 | End: 2025-04-18

## 2025-04-13 RX ORDER — SIMETHICONE 80 MG
80 TABLET,CHEWABLE ORAL 4 TIMES DAILY PRN
Status: DISCONTINUED | OUTPATIENT
Start: 2025-04-13 | End: 2025-04-14 | Stop reason: HOSPADM

## 2025-04-13 RX ADMIN — WITCH HAZEL 1 PAD: 500 SOLUTION RECTAL; TOPICAL at 05:52

## 2025-04-13 RX ADMIN — BENZOCAINE AND LEVOMENTHOL 1 APPLICATION: 200; 5 SPRAY TOPICAL at 05:51

## 2025-04-13 RX ADMIN — IBUPROFEN 600 MG: 600 TABLET ORAL at 23:04

## 2025-04-13 RX ADMIN — Medication 250 MILLI-UNITS/MIN: at 04:37

## 2025-04-13 RX ADMIN — Medication 500 MCG: at 11:04

## 2025-04-13 RX ADMIN — ONDANSETRON 4 MG: 2 INJECTION INTRAMUSCULAR; INTRAVENOUS at 04:42

## 2025-04-13 RX ADMIN — SODIUM CHLORIDE, SODIUM LACTATE, POTASSIUM CHLORIDE, AND CALCIUM CHLORIDE 999 ML/HR: .6; .31; .03; .02 INJECTION, SOLUTION INTRAVENOUS at 04:00

## 2025-04-13 RX ADMIN — SODIUM CHLORIDE, SODIUM LACTATE, POTASSIUM CHLORIDE, AND CALCIUM CHLORIDE 125 ML/HR: .6; .31; .03; .02 INJECTION, SOLUTION INTRAVENOUS at 05:04

## 2025-04-13 RX ADMIN — ROPIVACAINE HYDROCHLORIDE 10 ML: 2 INJECTION EPIDURAL; INFILTRATION; PERINEURAL at 04:10

## 2025-04-13 RX ADMIN — IBUPROFEN 600 MG: 600 TABLET ORAL at 05:09

## 2025-04-13 RX ADMIN — PRENATAL VIT W/ FE FUMARATE-FA TAB 27-0.8 MG 1 TABLET: 27-0.8 TAB at 11:04

## 2025-04-13 RX ADMIN — IBUPROFEN 600 MG: 600 TABLET ORAL at 11:04

## 2025-04-13 NOTE — ANESTHESIA PROCEDURE NOTES
Epidural Block    Patient location during procedure: OB/L&D  Start time: 4/13/2025 4:08 AM  Reason for block: at surgeon's request  Staffing  Performed by: Oneil Mills DO  Authorized by: Oneil Mills DO    Preanesthetic Checklist  Completed: patient identified, IV checked, site marked, risks and benefits discussed, surgical consent, monitors and equipment checked, pre-op evaluation and timeout performed  Epidural  Patient position: sitting  Prep: Betadine  Sedation Level: no sedation  Patient monitoring: heart rate, continuous pulse oximetry and cardiac monitor  Approach: midline  Location: lumbar, L3-4  Injection technique: CHULA air  Needle  Needle type: Tuohy   Needle gauge: 18 G  Catheter type: side hole  Catheter size: 20 G  Catheter securement method: tape  Test dose: negativeropivacaine (NAROPIN) 0.2% injection 10 mL - Epidural   10 mL - 4/13/2025 4:10:00 AM  Assessment  Sensory level: T10  Number of attempts: 1negative aspiration for CSF, negative aspiration for heme and no paresthesia on injection  patient tolerated the procedure well with no immediate complications

## 2025-04-13 NOTE — ANESTHESIA PREPROCEDURE EVALUATION
Procedure:  LABOR ANALGESIA    Relevant Problems   GYN   (+) 38 weeks gestation of pregnancy   (+) Supervision of high risk pregnancy in third trimester      NEURO/PSYCH   (+) Depression affecting pregnancy in third trimester, antepartum   (+) Generalized anxiety disorder   (+) Moderate episode of recurrent major depressive disorder (HCC)   (+) Other headache syndrome        Physical Exam    Airway    Mallampati score: II         Dental       Cardiovascular  Rhythm: regular, Rate: normal    Pulmonary   Breath sounds clear to auscultation    Other Findings  post-pubertal.      Anesthesia Plan  ASA Score- 2     Anesthesia Type- epidural with ASA Monitors.         Additional Monitors:     Airway Plan:            Plan Factors-Exercise tolerance (METS): >4 METS.    Chart reviewed.   Existing labs reviewed. Patient summary reviewed.    Patient is not a current smoker.              Induction- intravenous.    Postoperative Plan-     Perioperative Resuscitation Plan - Level 1 - Full Code.       Informed Consent- Anesthetic plan and risks discussed with patient.        NPO Status:  No vitals data found for the desired time range.

## 2025-04-13 NOTE — PLAN OF CARE
Problem: PAIN - ADULT  Goal: Verbalizes/displays adequate comfort level or baseline comfort level  Description: Interventions:- Encourage patient to monitor pain and request assistance- Assess pain using appropriate pain scale- Administer analgesics based on type and severity of pain and evaluate response- Implement non-pharmacological measures as appropriate and evaluate response- Consider cultural and social influences on pain and pain management- Notify physician/advanced practitioner if interventions unsuccessful or patient reports new pain  Outcome: Progressing     Problem: POSTPARTUM  Goal: Experiences normal postpartum course  Description: INTERVENTIONS:- Monitor maternal vital signs- Assess uterine involution and lochia  2025 by Olivia Dela Cruz RN  Outcome: Progressing  2025 by Olivia Dela Cruz RN  Outcome: Progressing  Goal: Appropriate maternal -  bonding  Description: INTERVENTIONS:- Identify family support- Assess for appropriate maternal/infant bonding -Encourage maternal/infant bonding opportunities- Referral to  or  as needed  2025 by Olivia Dela Cruz RN  Outcome: Progressing  2025 by Olivia Dela Cruz RN  Outcome: Progressing  Goal: Establishment of infant feeding pattern  Description: INTERVENTIONS:- Assess breast/bottle feeding- Refer to lactation as needed  2025 by Olivia Dela Cruz RN  Outcome: Progressing  2025 by Olivia Dela Cruz RN  Outcome: Progressing  Goal: Incision(s), wounds(s) or drain site(s) healing without S/S of infection  Description: INTERVENTIONS- Assess and document dressing, incision, wound bed, drain sites and   2025 by Olivia Dela Cruz RN  Outcome: Progressing  2025 by Olivia Dela Cruz RN  Outcome: Progressing

## 2025-04-13 NOTE — PLAN OF CARE
Problem: BIRTH - VAGINAL/ SECTION  Goal: Fetal and maternal status remain reassuring during the birth process  Description: INTERVENTIONS:- Monitor vital signs- Monitor fetal heart rate- Monitor uterine activity- Monitor labor progression (vaginal delivery)- DVT prophylaxis- Antibiotic prophylaxis  2025 by Olivia Dela Cruz RN  Outcome: Completed  2025 by Olivia Dela Cruz RN  Outcome: Progressing  2025 by Olivia Dela Cruz RN  Outcome: Progressing  Goal: Emotionally satisfying birthing experience for mother/fetus  Description: Interventions:- Assess, plan, implement and evaluate the nursing care given to the patient in labor- Advocate the philosophy that each childbirth experience is a unique experience and support the family's chosen level of involvement and control during the labor process - Actively participate in both the patient's and family's teaching of the birth process- Consider cultural, Mandaen and age-specific factors and plan care for the patient in labor  2025 by Olivia Dela Cruz RN  Outcome: Completed  2025 by Olivia Dela Cruz RN  Outcome: Progressing  2025 by Olivia Dela Cruz RN  Outcome: Progressing

## 2025-04-13 NOTE — L&D DELIVERY NOTE
Vaginal Delivery Summary - OB/GYN   Magy Sanders 25 y.o. female MRN: [id number]  Unit/Bed#: -01 Encounter: [id number]    Pre-delivery Diagnosis:   Pregnancy at 38 weeks gestation of pregnancy   Normal labor  MANDY  Depression    Post-delivery Diagnosis:   Same, delivered    Procedure: Spontaneous Vaginal Delivery     Attending Physician: Dr. Longo  Resident Physician: Dr. Joselito Winn    Anesthesia: Epidural    QBL: 13 cc  Admission H.8 g/dL  Admission platelets: 150 thousands/uL    Complications: none apparent    Specimens:   1. Arterial and venous cord gases  2. Cord blood  3. Segment of umbilical cord  4. Placenta to storage     Findings:  1. Viable female on 2025 at 4:36 AM, with APGARS of 8  and 9  at 1 and 5 minutes respectively. Weight pending at time of dictation for skin to skin bonding.  2. Spontaneous delivery of intact placenta at 0439    Gases:  Umbilical Artery  Recent Labs     25  0437   PHCART 7.230   BECART -8.9*       Umbilical Vein  Recent Labs     25  0437   PHCVEN 7.294   BECVEN -8.8*         Brief history and labor course:  Magy Sanders is now a 25 y.o. [id number] 38w5d who presented to labor and delivery for labor. Her pregnancy was complicated by depression/anxiety. On exam, she was noted to be 5.5/80/-2. She received an epidural and ruptured spontaneously for clear fluid. She progressed to complete cervical dilation and began pushing.    Description of delivery:    After pushing for 25 minutes, Magy delivered a viable female , wt pending as mother is doing skin to skin bonding. Fetal tones were down with mayra to the 70's, so a vacuum assisted delivery was attempted. The vacuum would not hold suction and was removed. Fetal tones were noted to improve afterwards back to baseline of 120 bpm with moderate variability. The fetal vertex delivered and restituted left occipital anterior position spontaneously. There was no nuchal cord. The anterior  shoulder delivered atraumatically with maternal expulsive forces and the assistance of gentle downward traction. The posterior shoulder delivered with maternal expulsive forces and the assistance of gentle upward traction. The remainder of the fetus delivered spontaneously.     Upon delivery, the infant was placed on the mothers abdomen and the cord was doubly clamped and cut. Delayed cord clamping was achieved.The infant was noted to cry spontaneously and was moving all extremities appropriately. There was no evidence for injury. Nurse resuscitators evaluating the . Arterial and venous cord blood gases and cord blood was collected for analysis. These were promptly sent to the lab. In the immediate post-partum, active management of the 3rd stage of labor was performed with massage, the administration of 30 units of IV pitocin, and gentle traction on the umbilical cord. The placenta delivered spontaneously and was noted to have a centrally inserted 3 vessel cord.  The placenta was sent to storage.    Bimanual exam revealed minimal clots and good uterine tone.  The fundus was firm and at the level of the umbilicus.  At the conclusion of the procedure, all needle, sponge, and instrument counts were noted to be correct. Patient tolerated the procedure well and was allowed to recover in labor and delivery room with family and  before being transferred to the post-partum floor.     I was present and participated in all key portions of the case.    Disposition:  The patient and the  both tolerated the procedure well and are recovering in labor and delivery room       Indigo Longo  2025  6:23 AM

## 2025-04-13 NOTE — ANESTHESIA POSTPROCEDURE EVALUATION
"Post-Op Assessment Note    CV Status:  Stable  Pain Score: 1    Pain management: adequate       Mental Status:  Alert and awake   Hydration Status:  Euvolemic   PONV Controlled:  Controlled   Airway Patency:  Patent     Post Op Vitals Reviewed: Yes    No anethesia notable event occurred.    Staff: Anesthesiologist           Last Filed PACU Vitals:  Vitals Value Taken Time   Temp     Pulse     BP     Resp     SpO2       /68   Pulse 62   Temp 97.7 °F (36.5 °C) (Oral)   Resp 20   Ht 5' 3\" (1.6 m)   Wt 73 kg (161 lb)   LMP 07/16/2024   SpO2 96%   BMI 28.52 kg/m²            "

## 2025-04-13 NOTE — ASSESSMENT & PLAN NOTE
Had presented to triage earlier for rule out, and SVE was 2.5/70/-2  Presenting in labor with contractions q3-5 mins

## 2025-04-13 NOTE — PLAN OF CARE
Problem: POSTPARTUM  Goal: Experiences normal postpartum course  Description: INTERVENTIONS:- Monitor maternal vital signs- Assess uterine involution and lochia  Reactivated  Goal: Appropriate maternal -  bonding  Description: INTERVENTIONS:- Identify family support- Assess for appropriate maternal/infant bonding -Encourage maternal/infant bonding opportunities- Referral to  or  as needed  Reactivated  Goal: Establishment of infant feeding pattern  Description: INTERVENTIONS:- Assess breast/bottle feeding- Refer to lactation as needed  Reactivated  Goal: Incision(s), wounds(s) or drain site(s) healing without S/S of infection  Description: INTERVENTIONS- Assess and document dressing, incision, wound bed, drain sites and surrounding tissue- Provide patient and family education- Perform skin care/dressing changes every

## 2025-04-13 NOTE — H&P
H & P- Obstetrics   Magy Sanders 25 y.o. female MRN: [id number]  Unit/Bed#: -01 Encounter: [id number]    Assessment: 25 y.o.  at 38w5d admitted for labor.     Plan:   38 weeks gestation of pregnancy  Assessment & Plan  Admit to OBGYN   Clear liquid diet, IVF LR 125cc/hr   F/u T&S, CBC, RPR   GBS negative; EFW: 26% @32  SVE: 5.5/80/-2  Continuous fetal monitoring and tocometry   Analgesia at maternal request   Vertex by TAUS  Contraception plan: undecided  Plan: expectant management       * Normal labor  Assessment & Plan  Had presented to triage earlier for rule out, and SVE was 2.5/70/-2  Presenting in labor with contractions q3-5 mins        Discussed case and plan w/ Dr. Longo      Chief Complaint: contractions    HPI: Magy Sanders is a 25 y.o.  with an MARIAMA of 2025, by Last Menstrual Period at 38w5d who is being admitted for labor . She complains of uterine contractions, occurring every 3-5 minutes, has no LOF, and reports no VB. She states she has felt good FM.    Patient Active Problem List   Diagnosis    COVID-19 vaccine dose declined    Generalized anxiety disorder    BMI less than 19,adult    Other headache syndrome    Moderate episode of recurrent major depressive disorder (HCC)    Supervision of high risk pregnancy in third trimester    Depression affecting pregnancy in third trimester, antepartum    38 weeks gestation of pregnancy    Nonimmune to hepatitis B virus    Hereditary disease in family possibly affecting fetus    Malcom Scott contractions    Abdominal cramping affecting pregnancy       Baby complications/comments: none    Review of Systems   Constitutional:  Negative for chills and fever.   HENT:  Negative for ear pain and sore throat.    Eyes:  Negative for pain and visual disturbance.   Respiratory:  Negative for cough and shortness of breath.    Cardiovascular:  Negative for chest pain and palpitations.   Gastrointestinal:  Positive for abdominal pain.  Negative for vomiting.   Genitourinary:  Positive for vaginal pain. Negative for dysuria, hematuria, vaginal bleeding and vaginal discharge.   Musculoskeletal:  Negative for arthralgias and back pain.   Skin:  Negative for color change and rash.   Neurological:  Negative for seizures and syncope.   Psychiatric/Behavioral:  The patient is not nervous/anxious.    All other systems reviewed and are negative.      OB Hx:  OB History    Para Term  AB Living   2 1 1 0 0 1   SAB IAB Ectopic Multiple Live Births   0 0 0 0 1      # Outcome Date GA Lbr Devonte/2nd Weight Sex Type Anes PTL Lv   2 Current            1 Term 21 39w0d / 01:58 3232 g (7 lb 2 oz) M Vag-Spont EPI  CLINT      Obstetric Comments   Menarche, 16   Son Jacek has congenital lung disorder called Nehi (Neuroendocrine cell hyperplasia of infancy)       Past Medical Hx:  Past Medical History:   Diagnosis Date    Anxiety     never req'd meds    Depression     never req'd meds    Varicella     vaccinated       Past Surgical hx:  Past Surgical History:   Procedure Laterality Date    NO PAST SURGERIES      WISDOM TOOTH EXTRACTION         Social Hx:  Alcohol use: denies  Tobacco use: denies  Other substance use: denies    No Known Allergies      Medications Prior to Admission:     Prenatal MV-Min-Fe Fum-FA-DHA (PRENATAL 1 PO)    vitamin B-12 (VITAMIN B-12) 500 mcg tablet    Objective:  HR:  [] 130  BP: (129-132)/(76-87) 129/87  Resp:  [18-20] 20  SpO2:  [98 %] 98 %  There is no height or weight on file to calculate BMI.     Physical Exam:  Physical Exam  Constitutional:       Appearance: Normal appearance.   HENT:      Head: Normocephalic and atraumatic.      Mouth/Throat:      Pharynx: Oropharynx is clear.   Eyes:      General: No scleral icterus.     Extraocular Movements: Extraocular movements intact.   Cardiovascular:      Rate and Rhythm: Normal rate and regular rhythm.      Pulses: Normal pulses.   Pulmonary:      Effort: Pulmonary effort  is normal. No respiratory distress.   Abdominal:      Comments: Gravid   Musculoskeletal:      Right lower leg: No edema.      Left lower leg: No edema.   Neurological:      General: No focal deficit present.      Mental Status: She is alert and oriented to person, place, and time.   Skin:     General: Skin is warm and dry.   Psychiatric:         Mood and Affect: Mood normal.         Behavior: Behavior normal.         Thought Content: Thought content normal.         Judgment: Judgment normal.   Vitals and nursing note reviewed. Exam conducted with a chaperone present.            FHT:  Tracing cat 1 with moderate variability with presence of 15x15 accelerations, and no decelerations    TOCO:   Ctx q3-5 mins    Lab Results   Component Value Date    WBC 11.71 (H) 01/15/2025    HGB 11.9 01/15/2025    HCT 35.4 01/15/2025     01/15/2025     Lab Results   Component Value Date    K 4.0 09/23/2024     09/23/2024    CO2 22 09/23/2024    BUN 12 09/23/2024    CREATININE 0.54 (L) 09/23/2024    AST 19 09/23/2024    ALT 16 09/23/2024       Prenatal Labs: Reviewed      Blood type: A positive  Antibody: negative  GBS: negative  HIV: Non-reactive  Rubella: Immune  Syphilis IgM/IgG: Non-reactive  HBsAg: Non-reactive  HCAb: Non-reactive  Chlamydia: Negative  Gonorrhea: Negative  Diabetes 1 hour screen: normal   3 hour glucose: not indicated  Platelets: 177k  Hgb: 11.9    >2 Midnights  INPATIENT     Late entry due to precipitous nature of delivery    Signature/Title: Joselito Winn MD  Date: 4/13/2025  Time: 3:49 AM

## 2025-04-13 NOTE — PLAN OF CARE
Problem: BIRTH - VAGINAL/ SECTION  Goal: Fetal and maternal status remain reassuring during the birth process  Description: INTERVENTIONS:- Monitor vital signs- Monitor fetal heart rate- Monitor uterine activity- Monitor labor progression (vaginal delivery)- DVT prophylaxis- Antibiotic prophylaxis  Outcome: Progressing  Goal: Emotionally satisfying birthing experience for mother/fetus  Description: Interventions:- Assess, plan, implement and evaluate the nursing care given to the patient in labor- Advocate the philosophy that each childbirth experience is a unique experience and support the family's chosen level of involvement and control during the labor process - Actively participate in both the patient's and family's teaching of the birth process- Consider cultural, Mormon and age-specific factors and plan care for the patient in labor  Outcome: Progressing

## 2025-04-13 NOTE — LACTATION NOTE
This note was copied from a baby's chart.  CONSULT - LACTATION  Baby Girl Sanders (Ciara) 0 days female MRN: [id number]    [facility] NURSERY Room / Bed: (N)/(N) Encounter: [id number]    Maternal Information     MOTHER:  Magy Sanders  Maternal Age: 25 y.o.  OB History: # 1 - Date: 21, Sex: Male, Weight: 3232 g (7 lb 2 oz), GA: 39w0d, Type: Vaginal, Spontaneous, Apgar1: 9, Apgar5: 9, Living: Living, Birth Comments: None    # 2 - Date: 25, Sex: Female, Weight: 3460 g (7 lb 10.1 oz), GA: 38w5d, Type: Vaginal, Spontaneous, Apgar1: 8, Apgar5: 9, Living: Living, Birth Comments: None   Previous breast reduction surgery? No    Lactation history:   Has patient previously breast fed: Yes   How long had patient previously breast fed: 6 months breast and pumping.   Previous breast feeding complications: Other (Comment) (Baby had a resp.illness and had to be supplemented with fortified formula with her breast milk.)     Past Surgical History:   Procedure Laterality Date    NO PAST SURGERIES      WISDOM TOOTH EXTRACTION         Birth information:  YOB: 2023   Time of birth: 4:36 AM   Sex: female   Delivery type: Vaginal, Spontaneous   Birth Weight: 3460 g (7 lb 10.1 oz)   Percent of Weight Change: 0%     Gestational Age: 38w5d   [unfilled]    Reason for Consult:    Reason for Consult: Initial assessment (routine) - 10 min, Discharge (routine) - 10 min    Risk Factors:         Breast and nipple assessment:   Breasts/Nipples  Left Breast: Soft  Right Breast: Soft  Left Nipple: Everted  Right Nipple: Everted  Intervention: Hand expression  Breastfeeding Status: Yes  Breastfeeding Progress: Not yet established    OB Lactation Tools:         Breast Pump:    Breast Pump  Pump: Personal (Has a Breast Pump ( hands free ) not received through Insurance. Gave mom pump information.)       Assessment: normal assessment    Feeding assessment:  not  assessed  LATCH:  Latch:    Audible Swallowing:    Type of Nipple:    Comfort (Breast/Nipple):    Hold (Positioning):    LATCH Score:      Feeding recommendations:  breast feed on demand    Initial Lactation Consult: Met with Magy who is an experienced breastfeeding mom. Mom states that baby has been sleepy but is latching well. The Ready Set Baby and the Discharge Breastfeeding Booklets were provided for mom to review. Magy reports that she has no questions at this time. Encouraged to call for breastfeeding support as needed.       Elif Malhotra RN 4/13/2025 5:40 PM

## 2025-04-14 VITALS
HEART RATE: 72 BPM | OXYGEN SATURATION: 99 % | RESPIRATION RATE: 16 BRPM | SYSTOLIC BLOOD PRESSURE: 108 MMHG | BODY MASS INDEX: 28.53 KG/M2 | TEMPERATURE: 98 F | DIASTOLIC BLOOD PRESSURE: 64 MMHG | HEIGHT: 63 IN | WEIGHT: 161 LBS

## 2025-04-14 PROCEDURE — NC001 PR NO CHARGE: Performed by: OBSTETRICS & GYNECOLOGY

## 2025-04-14 PROCEDURE — 99024 POSTOP FOLLOW-UP VISIT: CPT | Performed by: OBSTETRICS & GYNECOLOGY

## 2025-04-14 RX ORDER — ACETAMINOPHEN 325 MG/1
650 TABLET ORAL EVERY 4 HOURS PRN
Start: 2025-04-14

## 2025-04-14 RX ORDER — IBUPROFEN 200 MG
600 TABLET ORAL EVERY 6 HOURS
Start: 2025-04-14

## 2025-04-14 RX ADMIN — PRENATAL VIT W/ FE FUMARATE-FA TAB 27-0.8 MG 1 TABLET: 27-0.8 TAB at 09:10

## 2025-04-14 RX ADMIN — Medication 500 MCG: at 09:14

## 2025-04-14 RX ADMIN — IBUPROFEN 600 MG: 600 TABLET ORAL at 12:17

## 2025-04-14 NOTE — PROGRESS NOTES
Progress Note - OB/GYN   Name: Magy Sanders 25 y.o. female I MRN: [id number]  Unit/Bed#: -01 I Date of Admission: 2025   Date of Service: 2025 I Hospital Day: 1    Assessment & Plan  Generalized anxiety disorder    Depression affecting pregnancy in third trimester, antepartum     (spontaneous vaginal delivery)    Nonimmune to hepatitis B virus    Gestational hypertension  CBC and CMP wnl  Asymptomatic   Reviewed signs and symptoms of pre-eclampsia       OB Post-Partum Progress Note  Subjective   Post delivery. Patient is doing well. Lochia WNL. Pain well controlled. Patient has gestational HTN and it was discussed with her including the benefit of home BP monitoring program. No other concerns. Patient declined to have birth control. Discussed home BP monitoring and patient declined.     Pain: yes, cramping, improved with meds  Tolerating PO: yes  Voiding: yes  Flatus: yes  BM: yes  Ambulating: yes  Breastfeeding:  yes  Chest pain: no  Shortness of breath: no  Leg pain: no  Lochia: WNL    Objective :  Temp:  [97.7 °F (36.5 °C)-98 °F (36.7 °C)] 97.7 °F (36.5 °C)  HR:  [57-80] 62  BP: ()/(51-86) 95/51  Resp:  [16-20] 20  SpO2:  [96 %-98 %] 97 %  O2 Device: None (Room air)    Physical Exam  Vitals and nursing note reviewed.   Constitutional:       General: She is not in acute distress.     Appearance: She is well-developed.   HENT:      Head: Normocephalic and atraumatic.   Eyes:      Conjunctiva/sclera: Conjunctivae normal.   Cardiovascular:      Rate and Rhythm: Normal rate and regular rhythm.      Heart sounds: No murmur heard.  Pulmonary:      Effort: Pulmonary effort is normal. No respiratory distress.      Breath sounds: Normal breath sounds.   Abdominal:      Palpations: Abdomen is soft.      Tenderness: There is no abdominal tenderness.   Musculoskeletal:         General: No swelling.      Cervical back: Neck supple.      Right lower leg: No edema.      Left lower leg: No edema.    Skin:     General: Skin is warm and dry.      Capillary Refill: Capillary refill takes less than 2 seconds.   Neurological:      Mental Status: She is alert.   Psychiatric:         Mood and Affect: Mood normal.     Lab results:           Collected Updated Procedure Result Status    04/13/2025 0344 04/13/2025 0919 RPR-Syphilis Screening (Total Syphilis IGG/IGM) [[id number]]   Blood from Arm, Left    Final result Component Value   Treponema Pallidium Total Antibodies Non-reactive           04/13/2025 0506 04/13/2025 0528 Placenta in Storage [[id number]]   Tissue (Placenta on Hold) OB Only    In process Component Value   No component results          04/13/2025 0437 04/13/2025 0443 CORD, Blood gas, arterial [[id number]]   (Abnormal)   Blood, Arterial from Cord    Final result Component Value Units   pH, Cord Art 7.230    pCO2, Cord Art 45.3    pO2, Cord Art 27.4 High  mm HG   HCO3, Cord Art 18.5 mmol/L   Base Exc, Cord Art -8.9 Low  mmol/L   O2 Content, Cord Art 12.1 ml/dl   O2 Hgb, Arterial Cord 55.1 %          04/13/2025 0437 04/13/2025 0443 CORD, Blood gas, venous [[id number]]   (Abnormal)   Blood from Cord    Final result Component Value Units   pH, Cord Vu 7.294    pCO2, Cord Vu 35.3 mm HG   pO2, Cord Vu 44.8 mm HG   HCO3, Cord Vu 16.7 mmol/L   Base Exc, Cord Vu -8.8 Low  mmol/L   O2 Cont, Cord Vu 18.8 mL/dL   O2 HGB,VENOUS CORD 84.5 %          04/13/2025 0344 04/13/2025 0432 Type and screen [[id number]]   Blood from Arm, Left    Edited Result - FINAL Component Value   ABO Grouping A   Rh Factor Positive   Antibody Screen Negative   Specimen Expiration Date 20250416 04/13/2025 0344 04/13/2025 0408 Comprehensive metabolic panel [[id number]]    (Abnormal)   Blood from Arm, Left    Final result Component Value Units   Sodium 137 mmol/L   Potassium 4.4 mmol/L   Chloride 105 mmol/L   CO2 23 mmol/L   ANION GAP 9 mmol/L   BUN 11 mg/dL   Creatinine 0.66  mg/dL   Glucose 100  mg/dL   Calcium 9.3 mg/dL   AST 17  U/L   ALT 13  U/L   Alkaline Phosphatase 150 High  U/L   Total Protein 7.0 g/dL   Albumin 3.8 g/dL   Total Bilirubin 0.31  mg/dL   eGFR 124 ml/min/1.73sq m          04/13/2025 0344 04/13/2025 0349 CBC and Platelet [[id number]]   (Abnormal)   Blood from Arm, Left    Final result Component Value Units   WBC 13.22 High  Thousand/uL   RBC 4.12 Million/uL   Hemoglobin 12.8 g/dL   Hematocrit 37.1 %   MCV 90 fL   MCH 31.1 pg   MCHC 34.5 g/dL   RDW 13.2 %   Platelets 150 Thousands/uL   MPV 11.6 fL          03/27/2025 1041 03/29/2025 1321 Strep B DNA probe, amplification [[id number]]    Genital from Vaginal/Rectal    Final result Component Value   Strep Grp B PCR Negative

## 2025-04-14 NOTE — UTILIZATION REVIEW
"Mother and baby discharged 2025      NOTIFICATION OF INPATIENT ADMISSION   MATERNITY/DELIVERY AUTHORIZATION REQUEST   SERVICING FACILITY:   [facility] - L&D, , NICU  [address] [place] [address]  Tax ID: [id number]  NPI: [id number] ATTENDING PROVIDER:  Attending Name and NPI#: Indigo Longo Md [[id number]]  Address: [address] [place] [address]  Phone: [phone]     ADMISSION INFORMATION:  Place of Service: Inpatient [facility]  Place of Service Code: 21  Inpatient Admission Date/Time: 25  3:49 AM  Discharge Date/Time: 2025 12:40 PM  Admitting Diagnosis Code/Description:  Abdominal pain during pregnancy in third trimester [O26.893, R10.9]   Mother: Magy Sanders 2000 Estimated Date of Delivery: 25  Delivering clinician: Indigo Longo   OB History          2    Para   2    Term   2       0    AB   0    Living   2         SAB   0    IAB   0    Ectopic   0    Multiple   0    Live Births   2           Obstetric Comments   Menarche, 16  Son Jacek has congenital lung disorder called Nehi (Neuroendocrine cell hyperplasia of infancy)              Name & MRN:   Information for the patient's :  Tommy, Baby Girl (Magy) [[id number]]    Delivery Information:  Sex: female  Delivered 2025 4:36 AM by Vaginal, Spontaneous; Gestational Age: 38w5d     Measurements:  Weight: 7 lb 10.1 oz (3460 g);  Height: 21.5\"    APGAR 1 minute 5 minutes 10 minutes   Totals: 8 9       UTILIZATION REVIEW CONTACT:  Ami [place] Utilization   Network Utilization Review Department  Phone: [phone]  Fax [phone]  Email: [email or web address]  Contact for approvals/pending authorizations, clinical reviews, and discharge.   PHYSICIAN ADVISORY SERVICES:  Medical Necessity Denial & Dqfu-kk-Qeuk Review  Phone: [phone]  Fax: [phone]  Email: " [email or web address]   DISCHARGE SUPPORT TEAM:  For Patients Discharge Needs & Updates  Phone: [phone] opt. 2 Fax: [phone]  Email: [email or web address]

## 2025-04-14 NOTE — DISCHARGE SUMMARY
Discharge Summary - OB/GYN   Name: Magy Sanders 25 y.o. female I MRN: [id number]  Unit/Bed#: -01 I Date of Admission: 2025   Date of Service: 2025 I Hospital Day: 1    ADMISSION  Patient of: [place], [place]  Admission Date: 2025   Admitting Attending: Dr. Indigo Longo MD  Admitting Diagnoses:   Patient Active Problem List   Diagnosis    COVID-19 vaccine dose declined    Generalized anxiety disorder    BMI less than 19,adult    Other headache syndrome    Moderate episode of recurrent major depressive disorder (HCC)    Supervision of high risk pregnancy in third trimester    Depression affecting pregnancy in third trimester, antepartum     (spontaneous vaginal delivery)    Nonimmune to hepatitis B virus    Hereditary disease in family possibly affecting fetus    [place] contractions    Abdominal cramping affecting pregnancy    Normal labor    Gestational hypertension       DELIVERY  Delivery Method: Vaginal, Spontaneous   Delivery Date and Time: 2025 4:36 AM  Delivery Attending: Indigo Longo    DISCHARGE  Discharge Date:   Discharge Attending:   Discharge Diagnosis:   Same, Delivered    Clinical course: Admission to Delivery   Magy Sanders is now a 25 y.o. [id number] 38w5d who presented to labor and delivery for labor. Her pregnancy was complicated by depression/anxiety. On exam, she was noted to be 5.5/80/-2. She received an epidural and ruptured spontaneously for clear fluid. She progressed to complete cervical dilation and began pushing. After pushing for 25 minutes, Magy delivered a viable female , wt pending as mother is doing skin to skin bonding. Fetal tones were down with mayra to the 70's, so a vacuum assisted delivery was attempted. The vacuum would not hold suction and was removed. Fetal tones were noted to improve afterwards back to baseline of 120 bpm with moderate variability. The fetal vertex delivered and restituted left  occipital anterior position spontaneously. There was no nuchal cord. The anterior shoulder delivered atraumatically with maternal expulsive forces and the assistance of gentle downward traction. The posterior shoulder delivered with maternal expulsive forces and the assistance of gentle upward traction. The remainder of the fetus delivered spontaneously.        Anesthesia: Epidural,   QBL: Non-Surgical QBL (mL): 13      QBL:   13     Delivery: Vaginal, Spontaneous at 2025 4:36 AM   Laceration: Perineal: None     Baby's Weight: 3460 g (7 lb 10.1 oz); 122.05    Apgar scores: 8  and 9  at 1 and 5 minutes, respectively    Clinical Course: Post-Delivery:  The post delivery course was remarkable. Patient experienced gestational HTN, agreed to be enrolled in home PB monitoring program. Readings ranged from 140/82-95/51.     On the day of discharge, the patient was ambulating, voiding spontaneously, tolerating oral intake, and hemodynamically stable. She was able to reasonably perform all ADLs. She had appropriate bowel function. Pain was well-controlled. She was discharged home on postpartum/postop day #1 without complications. Patient was instructed to follow up with her OB as an outpatient and was given appropriate warnings to call her provider with problems or concerns.    Pertinent lab findings included:   Blood type A+.     Last three Hgb values:  Lab Results   Component Value Date    HGB 12.8 2025    HGB 11.9 01/15/2025    HGB 12.8 2024        Problem-specific follow-up plans included the following:  None    Discharge med list:  Contraception: None     Medication List      START taking these medications     acetaminophen 325 mg tablet; Commonly known as: TYLENOL; Take 2 tablets   (650 mg total) by mouth every 4 (four) hours as needed for mild pain   Blood Pressure Monitor Automat Nela; Use 2 (two) times a day   ibuprofen 200 mg tablet; Commonly known as: MOTRIN; Take 3 tablets (600   mg total) by  mouth every 6 (six) hours     CONTINUE taking these medications     PRENATAL 1 PO   vitamin B-12 500 mcg tablet; Commonly known as: VITAMIN B-12       Condition at discharge:   good     Disposition:   Home    Planned Readmission:   No    Discharge Statement:  I have spent a total time of 30 minutes in caring for this patient on the day of the visit/encounter. >30 minutes of time was spent on: Impressions, Counseling / Coordination of care, Documenting in the medical record, Reviewing / ordering tests, medicine, procedures  , and Communicating with other healthcare professionals .

## 2025-04-14 NOTE — PLAN OF CARE
Problem: POSTPARTUM  Goal: Experiences normal postpartum course  Description: INTERVENTIONS:- Monitor maternal vital signs- Assess uterine involution and lochia  Outcome: Progressing  Goal: Appropriate maternal -  bonding  Description: INTERVENTIONS:- Identify family support- Assess for appropriate maternal/infant bonding -Encourage maternal/infant bonding opportunities- Referral to  or  as needed  Outcome: Progressing  Goal: Establishment of infant feeding pattern  Description: INTERVENTIONS:- Assess breast/bottle feeding- Refer to lactation as needed  Outcome: Progressing  Goal: Incision(s), wounds(s) or drain site(s) healing without S/S of infection  Description: INTERVENTIONS- Assess and document dressing, incision, wound bed, drain sites and surrounding tissue- Provide patient and family education- Perform skin care/dressing changes every   Outcome: Progressing     Problem: PAIN - ADULT  Goal: Verbalizes/displays adequate comfort level or baseline comfort level  Description: Interventions:- Encourage patient to monitor pain and request assistance- Assess pain using appropriate pain scale- Administer analgesics based on type and severity of pain and evaluate response- Implement non-pharmacological measures as appropriate and evaluate response- Consider cultural and social influences on pain and pain management- Notify physician/advanced practitioner if interventions unsuccessful or patient reports new pain  Outcome: Progressing     Problem: INFECTION - ADULT  Goal: Absence or prevention of progression during hospitalization  Description: INTERVENTIONS:- Assess and monitor for signs and symptoms of infection- Monitor lab/diagnostic results- Monitor all insertion sites, i.e. indwelling lines, tubes, and drains- Monitor endotracheal if appropriate and nasal secretions for changes in amount and color- [place] appropriate cooling/warming therapies per order- Administer medications  as ordered- Instruct and encourage patient and family to use good hand hygiene technique- Identify and instruct in appropriate isolation precautions for identified infection/condition  Outcome: Progressing     Problem: SAFETY ADULT  Goal: Patient will remain free of falls  Description: INTERVENTIONS:- Educate patient/family on patient safety including physical limitations- Instruct patient to call for assistance with activity - Consult OT/PT to assist with strengthening/mobility - Keep Call bell within reach- Keep bed low and locked with side rails adjusted as appropriate- Keep care items and personal belongings within reach- Initiate and maintain comfort rounds- Make Fall Risk Sign visible to staff- Offer Toileting every  Hours, in advance of need- Initiate/Maintain alarm- Obtain necessary fall risk management equipmen- Apply yellow socks and bracelet for high fall risk patients- Consider moving patient to room near nurses station  Outcome: Progressing

## 2025-04-14 NOTE — NURSING NOTE
SAVE your life magnet (English) and  discharge pamphlets (English) given to patient. Discharge education provided to patient and all questions answered at this time. Patient encouraged to call if more questions arise.

## 2025-04-16 ENCOUNTER — NURSE TRIAGE (OUTPATIENT)
Age: 25
End: 2025-04-16

## 2025-04-16 NOTE — TELEPHONE ENCOUNTER
Regarding: Postpartum visit  ----- Message from Colleen CHENG sent at 4/16/2025  1:41 PM EDT -----  Patient called to schedule 3wk PP visit. Patient was told to monitor her BP. But from patient Dr. Cornejo saw patient in hospital and told her since she is doing a program online with her phone that she did not have to come in office so soon to be seen and that 3wk appt out is okay. I just am reaching out to confirm this is okay. Patient can be reached at [phone]  Thank You

## 2025-04-18 ENCOUNTER — TELEPHONE (OUTPATIENT)
Dept: OBGYN CLINIC | Facility: CLINIC | Age: 25
End: 2025-04-18

## 2025-04-18 NOTE — TELEPHONE ENCOUNTER
Patient has been noncompliant in BP monitoring program.  Has not submitted blood pressure readings since 4/16.  Called to remind patient to submit readings.  Left reminder message on voicemail to submit BP readings twice daily.

## 2025-04-21 LAB — PLACENTA IN STORAGE: NORMAL

## 2025-04-21 NOTE — TELEPHONE ENCOUNTER
Patient has been noncompliant in BP monitoring program.  Has not submitted blood pressure readings since .  Called to remind patient to submit readings.  Spoke to patient.  Reviewed instructions for submitting readings. Verbalized understanding and will submit BP readings.     Pt expressed she would like to be unenrolled from the OB BP Monitoring program. She stated her  was admitted for jaundice and it has been a lot to manage as well with a toddler at home. Did advise to continue on reporting bp's as she is one week pp. Pt scheduled for ppv .

## 2025-04-22 NOTE — TELEPHONE ENCOUNTER
Patient has been noncompliant in BP monitoring program.  Has not submitted blood pressure readings since 4/19.  Called to remind patient to submit readings.  Left reminder message on voicemail to submit BP readings twice daily.

## 2025-04-23 NOTE — TELEPHONE ENCOUNTER
Patient has been noncompliant in BP monitoring program.  Has not submitted blood pressure readings since 4/19/25.  Called to remind patient to submit readings.  Left reminder message on voicemail to submit BP readings twice daily.

## 2025-04-24 NOTE — TELEPHONE ENCOUNTER
Patient has been noncompliant in BP monitoring program.  Has not submitted blood pressure readings since 4/19/2025.  Called to remind patient to submit readings.  Left reminder message on voicemail to submit BP readings twice daily.

## 2025-05-01 ENCOUNTER — TELEPHONE (OUTPATIENT)
Dept: FAMILY MEDICINE CLINIC | Facility: CLINIC | Age: 25
End: 2025-05-01